# Patient Record
Sex: MALE | Race: WHITE | Employment: OTHER | URBAN - NONMETROPOLITAN AREA
[De-identification: names, ages, dates, MRNs, and addresses within clinical notes are randomized per-mention and may not be internally consistent; named-entity substitution may affect disease eponyms.]

---

## 2020-11-20 ENCOUNTER — TELEPHONE (OUTPATIENT)
Dept: FAMILY MEDICINE CLINIC | Age: 65
End: 2020-11-20

## 2020-11-20 NOTE — TELEPHONE ENCOUNTER
[address] [facility]     Patient spouse has questions regarding getting medicaid for patient. And is unsure how to get the process started. I was unsure if you might have any information to relay to them regarding this. Patient wife ph. [phone]    Thank you,.

## 2020-11-23 ENCOUNTER — CARE COORDINATION (OUTPATIENT)
Dept: CARE COORDINATION | Age: 65
End: 2020-11-23

## 2020-11-23 NOTE — CARE COORDINATION
Ambulatory Care Coordination Note  11/23/2020  CM Risk Score: 0  Charlson 10 Year Mortality Risk Score: 23%     ACC: Maximo Ahumada, ASHLEY    Summary Note:      I called to follow up with Armida Stevenson per her request from the [place] office. She states that her  has had 2 strokes and does not have medical insurance. She says that he has not worked since 2016. She tells me that she would like to get him on disability so that he can be insured. She says that she spoke with someone from the \"medicaid office\" but they were \"rude and did not want to answer my questions. \"  I explained that the only place to obtain assistance and Medicaid is through [employer]. I also explained that with COVID they are not seeing many people in the office. I suggested that she call and make a phone appointment to get things started. She does have the information for [employer] and will follow up with that department. Goals Addressed    None         Prior to Admission medications    Medication Sig Start Date End Date Taking?  Authorizing Provider   amLODIPine (NORVASC) 10 MG tablet TAKE 1 TABLET BY MOUTH ONCE DAILY 11/17/16   Demar Do MD   pravastatin (PRAVACHOL) 40 MG tablet Take 1 tablet by mouth daily 11/17/16   Demar Do MD   dipyridamole-aspirin [facility])  MG per extended release capsule Take 1 capsule by mouth 2 times daily 11/17/16   Demar Do MD       Future Appointments   Date Time Provider Peter Gallo   12/2/2020 10:00 AM Demar Do MD [facility] [place]
[employer]

## 2020-12-02 ENCOUNTER — OFFICE VISIT (OUTPATIENT)
Dept: FAMILY MEDICINE CLINIC | Age: 65
End: 2020-12-02

## 2020-12-02 VITALS
OXYGEN SATURATION: 96 % | TEMPERATURE: 97.2 F | HEART RATE: 85 BPM | DIASTOLIC BLOOD PRESSURE: 74 MMHG | BODY MASS INDEX: 29.06 KG/M2 | SYSTOLIC BLOOD PRESSURE: 118 MMHG | HEIGHT: 70 IN | WEIGHT: 203 LBS

## 2020-12-02 PROCEDURE — 99212 OFFICE O/P EST SF 10 MIN: CPT | Performed by: FAMILY MEDICINE

## 2020-12-02 RX ORDER — CLOPIDOGREL BISULFATE 75 MG/1
75 TABLET ORAL DAILY
Qty: 30 TABLET | Refills: 11 | Status: SHIPPED | OUTPATIENT
Start: 2020-12-02 | End: 2021-03-10 | Stop reason: SDUPTHER

## 2020-12-02 RX ORDER — PRAVASTATIN SODIUM 40 MG
40 TABLET ORAL DAILY
Qty: 30 TABLET | Refills: 11 | Status: SHIPPED | OUTPATIENT
Start: 2020-12-02 | End: 2021-03-10 | Stop reason: SDUPTHER

## 2020-12-02 SDOH — ECONOMIC STABILITY: INCOME INSECURITY: HOW HARD IS IT FOR YOU TO PAY FOR THE VERY BASICS LIKE FOOD, HOUSING, MEDICAL CARE, AND HEATING?: NOT HARD AT ALL

## 2020-12-02 SDOH — ECONOMIC STABILITY: TRANSPORTATION INSECURITY
IN THE PAST 12 MONTHS, HAS THE LACK OF TRANSPORTATION KEPT YOU FROM MEDICAL APPOINTMENTS OR FROM GETTING MEDICATIONS?: NO

## 2020-12-02 SDOH — ECONOMIC STABILITY: FOOD INSECURITY: WITHIN THE PAST 12 MONTHS, YOU WORRIED THAT YOUR FOOD WOULD RUN OUT BEFORE YOU GOT MONEY TO BUY MORE.: NEVER TRUE

## 2020-12-02 SDOH — ECONOMIC STABILITY: FOOD INSECURITY: WITHIN THE PAST 12 MONTHS, THE FOOD YOU BOUGHT JUST DIDN'T LAST AND YOU DIDN'T HAVE MONEY TO GET MORE.: NEVER TRUE

## 2020-12-02 SDOH — ECONOMIC STABILITY: TRANSPORTATION INSECURITY
IN THE PAST 12 MONTHS, HAS LACK OF TRANSPORTATION KEPT YOU FROM MEETINGS, WORK, OR FROM GETTING THINGS NEEDED FOR DAILY LIVING?: NO

## 2020-12-02 ASSESSMENT — PATIENT HEALTH QUESTIONNAIRE - PHQ9
SUM OF ALL RESPONSES TO PHQ QUESTIONS 1-9: 0
SUM OF ALL RESPONSES TO PHQ QUESTIONS 1-9: 0
SUM OF ALL RESPONSES TO PHQ9 QUESTIONS 1 & 2: 0
SUM OF ALL RESPONSES TO PHQ QUESTIONS 1-9: 0
2. FEELING DOWN, DEPRESSED OR HOPELESS: 0
1. LITTLE INTEREST OR PLEASURE IN DOING THINGS: 0

## 2020-12-02 NOTE — PROGRESS NOTES
Chief Complaint   Patient presents with   Orvilleemmanuel Rodolfo     Pt is self pay, been off of medications for about a year and a half. wanted to discuss getting bloodwork.  Health Maintenance     Pt does not get flu shot       HPI:  Fidelina Gonzalez is a 60 y.o. male  Chronic issue follow up    Not seen since 2016 due to no insurance     Hx CVA, HTN, dyslipidemia    Quit smoking  No further stroke symptoms   Wife notes some memory issues more recently    Has generally been feeling well    Due for labs and refilled  Doesn't want bloodwork done    Losing his insurance after the first of the year!     Home BP not being checked - needs to  Remains smoke free    Social History     Socioeconomic History    Marital status:      Spouse name: None    Number of children: None    Years of education: None    Highest education level: None   Occupational History    None   Social Needs    Financial resource strain: Not hard at all   [place]-Jes[place]s insecurity     Worry: Never true     Inability: Never true    Transportation needs     Medical: No     Non-medical: No   Tobacco Use    Smoking status: Former Smoker     Packs/day: 0.50     Years: 37.00     Pack years: 18.50     Types: Cigarettes     Start date: 1976     Last attempt to quit: 2013     Years since quittin.0    Smokeless tobacco: Never Used   Substance and Sexual Activity    Alcohol use: Yes     Comment: social - only small amount on Friday, sometimes heavier on Saturday    Drug use: None    Sexual activity: None   Lifestyle    Physical activity     Days per week: None     Minutes per session: None    Stress: None   Relationships    Social connections     Talks on phone: None     Gets together: None     Attends Episcopalian service: None     Active member of club or organization: None     Attends meetings of clubs or organizations: None     Relationship status: None    Intimate partner violence     Fear of current or ex partner: None     Emotionally abused: None     Physically abused: None     Forced sexual activity: None   Other Topics Concern    None   Social History Narrative    None         MRI from past   IMPRESSION ACUTE NONHEMORRHAGIC AREA OF ISCHEMIC INFARCTION INVOLVING THE POSTERIOR LEFT BASAL GANGLIA IN THE REGION OF THE GLOBUS PALLIDUS. Wt Readings from Last 3 Encounters:   12/02/20 203 lb (92.1 kg)   11/17/16 216 lb 6.4 oz (98.2 kg)   11/20/15 190 lb 9.6 oz (86.5 kg)       Patient Active Problem List   Diagnosis    Allergic rhinitis    Hypertension    St[address] [address]    Pilonidal cyst       Current Outpatient Medications   Medication Sig Dispense Refill    clopidogrel (PLAVIX) 75 MG tablet Take 1 tablet by mouth daily 30 tablet 11    pravastatin (PRAVACHOL) 40 MG tablet Take 1 tablet by mouth daily 30 tablet 11    amLODIPine (NORVASC) 10 MG tablet TAKE 1 TABLET BY MOUTH ONCE DAILY 30 tablet 11    dipyridamole-aspirin (AGGRENOX)  MG per extended release capsule Take 1 capsule by mouth 2 times daily 60 capsule 11     No current facility-administered medications for this visit. Patient's medications, allergies, past medical, surgical, social and family histories were reviewed and updated as appropriate. Review of Systems:   General ROS: negative for - chills, fatigue, fever, malaise, weight gain or weight loss  Respiratory ROS: no cough, shortness of breath, or wheezing  Cardiovascular ROS: no chest pain or dyspnea on exertion  Gastrointestinal ROS: no abdominal pain, change in bowel habits, or black or bloody stools  Genito-Urinary ROS: no dysuria, trouble voiding  Musculoskeletal ROS: negative for - gait disturbance, joint pain or joint stiffness  Neurological ROS: slow speech    In general patient otherwise reports feeling well.      Physical Exam:  /74 (Site: Left Upper Arm, Position: Sitting, Cuff Size: Large Adult)   Pulse 85   Temp 97.2 °F (36.2 °C) (Infrared)   Ht 5' 10\" (1.778 m)   Wt 203 lb (92.1 kg) SpO2 96%   BMI 29.13 kg/m²     Gen: Well, NAD, Alert, Oriented x 3   HEENT: EOMI, eyes clear, MMM  Skin: without rash or jaundice  Neck: no significant lymphadenopathy or thyromegaly  Lungs: CTA B w/out Rales/Wheezes/Rhonchi, Good respiratory effort   Heart: RRR, S1S2, w/out M/R/G, non-displaced PMI   Abdomen: Soft NT/ND, w/out R/G, w/ +BSx4   Ext: No C/C/E Bilaterally. Neuro: overall appears neurologically intact      Lab Results   Component Value Date    WBC 7.3 11/20/2015    HGB 15.7 11/20/2015    HCT 48.9 11/20/2015     11/20/2015    CHOL 219 (H) 11/20/2015    TRIG 69 11/20/2015    HDL 76 (H) 11/20/2015    ALT 38 11/20/2015    AST 27 11/20/2015     11/20/2015    K 4.2 11/20/2015    CL 99 11/20/2015    CREATININE 0.88 11/20/2015    BUN 11 11/20/2015    CO2 23 11/20/2015    TSH 2.862 09/21/2012    INR 1.0 11/17/2013         A&P   Diagnosis Orders   1. Cerebrovascular accident (CVA), unspecified mechanism ([address]  clopidogrel (PLAVIX) 75 MG tablet   2. Essential hypertension  pravastatin (PRAVACHOL) 40 MG tablet   3.  Cerebrovascular accident (CVA) due to thrombosis of precerebral artery (HCC)  pravastatin (PRAVACHOL) 40 MG tablet        Continue to monitor bp at home  Hasn't been checking  He has remained smoke free    Plavix, pravachol, aspirin  Maybe norvasc    [facility] for labs    Good rx card    Sign up for medicare when eligible              Lyla Maravilla MD

## 2020-12-13 LAB
ALBUMIN SERPL-MCNC: 4.8 G/DL
ALP BLD-CCNC: 88 U/L
ALT SERPL-CCNC: 43 U/L
ANION GAP SERPL CALCULATED.3IONS-SCNC: NORMAL MMOL/L
AST SERPL-CCNC: 24 U/L
AVERAGE GLUCOSE: NORMAL
BILIRUB SERPL-MCNC: 0.6 MG/DL (ref 0.1–1.4)
BUN BLDV-MCNC: 11 MG/DL
CALCIUM SERPL-MCNC: 9.6 MG/DL
CHLORIDE BLD-SCNC: 100 MMOL/L
CHOLESTEROL, TOTAL: 203 MG/DL
CHOLESTEROL/HDL RATIO: NORMAL
CO2: 27 MMOL/L
CREAT SERPL-MCNC: 0.94 MG/DL
GFR CALCULATED: <60
GLUCOSE BLD-MCNC: 307 MG/DL
HBA1C MFR BLD: 11.3 %
HDLC SERPL-MCNC: 48 MG/DL (ref 35–70)
LDL CHOLESTEROL CALCULATED: 134 MG/DL (ref 0–160)
NONHDLC SERPL-MCNC: NORMAL MG/DL
POTASSIUM SERPL-SCNC: 4.2 MMOL/L
PROSTATE SPECIFIC ANTIGEN: 1.52 NG/ML
SODIUM BLD-SCNC: 139 MMOL/L
TOTAL PROTEIN: 7
TRIGL SERPL-MCNC: 107 MG/DL
TSH SERPL DL<=0.05 MIU/L-ACNC: 2.67 UIU/ML
VLDLC SERPL CALC-MCNC: NORMAL MG/DL

## 2021-01-08 ENCOUNTER — OFFICE VISIT (OUTPATIENT)
Dept: FAMILY MEDICINE CLINIC | Age: 66
End: 2021-01-08

## 2021-01-08 VITALS
OXYGEN SATURATION: 94 % | TEMPERATURE: 98.2 F | DIASTOLIC BLOOD PRESSURE: 82 MMHG | HEIGHT: 70 IN | WEIGHT: 207 LBS | HEART RATE: 78 BPM | BODY MASS INDEX: 29.63 KG/M2 | SYSTOLIC BLOOD PRESSURE: 136 MMHG

## 2021-01-08 DIAGNOSIS — E11.65 TYPE 2 DIABETES MELLITUS WITH HYPERGLYCEMIA, WITHOUT LONG-TERM CURRENT USE OF INSULIN (HCC): ICD-10-CM

## 2021-01-08 DIAGNOSIS — I63.9 CEREBROVASCULAR ACCIDENT (CVA), UNSPECIFIED MECHANISM (HCC): Primary | ICD-10-CM

## 2021-01-08 DIAGNOSIS — I10 ESSENTIAL HYPERTENSION: ICD-10-CM

## 2021-01-08 PROCEDURE — 99212 OFFICE O/P EST SF 10 MIN: CPT | Performed by: FAMILY MEDICINE

## 2021-01-08 ASSESSMENT — PATIENT HEALTH QUESTIONNAIRE - PHQ9
1. LITTLE INTEREST OR PLEASURE IN DOING THINGS: 0
SUM OF ALL RESPONSES TO PHQ QUESTIONS 1-9: 0

## 2021-01-08 NOTE — PROGRESS NOTES
Chief Complaint   Patient presents with    Results     go over blood work, diaetic        HPI:  Ceferino Clinton is a 60 y.o. male    Hx CVA, HTN, dyslipidemia    Lbs done through [facility] showed diabetes with significant hyperglycemia    Father was diabetic     \"addicted to milkshakes/ice cream\"    We discussed changing diet habits      Lab Results   Component Value Date    LABA1C 11.3 (H) 2020     No results found for: EAG      Social History     Socioeconomic History    Marital status:      Spouse name: None    Number of children: None    Years of education: None    Highest education level: None   Occupational History    None   Social Needs    Financial resource strain: Not hard at all   [place] insecurity     Worry: Never true     Inability: Never true    Transportation needs     Medical: No     Non-medical: No   Tobacco Use    Smoking status: Former Smoker     Packs/day: 0.50     Years: 37.00     Pack years: 18.50     Types: Cigarettes     Start date: 1976     Quit date: 2013     Years since quittin.1    Smokeless tobacco: Never Used   Substance and Sexual Activity    Alcohol use: Yes     Comment: social - only small amount on Friday, sometimes heavier on Saturday    Drug use: None    Sexual activity: None   Lifestyle    Physical activity     Days per week: None     Minutes per session: None    Stress: None   Relationships    Social connections     Talks on phone: None     Gets together: None     Attends Sabianism service: None     Active member of club or organization: None     Attends meetings of clubs or organizations: None     Relationship status: None    Intimate partner violence     Fear of current or ex partner: None     Emotionally abused: None     Physically abused: None     Forced sexual activity: None   Other Topics Concern    None   Social History Narrative    None         MRI from past   IMPRESSION ACUTE NONHEMORRHAGIC AREA OF ISCHEMIC INFARCTION INVOLVING THE POSTERIOR LEFT BASAL GANGLIA IN THE REGION OF THE GLOBUS PALLIDUS. Wt Readings from Last 3 Encounters:   01/08/21 207 lb (93.9 kg)   12/02/20 203 lb (92.1 kg)   11/17/16 216 lb 6.4 oz (98.2 kg)       Patient Active Problem List   Diagnosis    Allergic rhinitis    Hypertension    Stroke ([facility] [address]    Pilonidal cyst       Current Outpatient Medications   Medication Sig Dispense Refill    metFORMIN (GLUCOPHAGE) 1000 MG tablet Take 1 tablet by mouth 2 times daily (with meals) 60 tablet 5    clopidogrel (PLAVIX) 75 MG tablet Take 1 tablet by mouth daily 30 tablet 11    pravastatin (PRAVACHOL) 40 MG tablet Take 1 tablet by mouth daily 30 tablet 11    dipyridamole-aspirin (AGGRENOX)  MG per extended release capsule Take 1 capsule by mouth 2 times daily 60 capsule 11    amLODIPine (NORVASC) 10 MG tablet TAKE 1 TABLET BY MOUTH ONCE DAILY (Patient not taking: Reported on 1/8/2021) 30 tablet 11     No current facility-administered medications for this visit. Patient's medications, allergies, past medical, surgical, social and family histories were reviewed and updated as appropriate. Review of Systems:   General ROS: negative for - chills, fatigue, fever, malaise, weight gain or weight loss  Respiratory ROS: no cough, shortness of breath, or wheezing  Cardiovascular ROS: no chest pain or dyspnea on exertion  Gastrointestinal ROS: no abdominal pain, change in bowel habits, or black or bloody stools  Genito-Urinary ROS: no dysuria, trouble voiding  Musculoskeletal ROS: negative for - gait disturbance, joint pain or joint stiffness  Neurological ROS: normal speech    In general patient otherwise reports feeling well.      Physical Exam:  /82 (Site: Left Upper Arm)   Pulse 78   Temp 98.2 °F (36.8 °C)   Ht 5' 10\" (1.778 m)   Wt 207 lb (93.9 kg)   SpO2 94%   BMI 29.70 kg/m²     Gen: Well, NAD, Alert, Oriented x 3   HEENT: EOMI, eyes clear, MMM  Skin: without rash or jaundice  Neuro: overall appears neurologically intact      Lab Results   Component Value Date    WBC 7.3 11/20/2015    HGB 15.7 11/20/2015    HCT 48.9 11/20/2015     11/20/2015    CHOL 203 (H) 12/13/2020    TRIG 107 12/13/2020    HDL 48 12/13/2020    ALT 43 (H) 12/13/2020    AST 24 12/13/2020     12/13/2020    K 4.2 12/13/2020     12/13/2020    CREATININE 0.94 12/13/2020    BUN 11 12/13/2020    CO2 27 12/13/2020    TSH 2.650 12/13/2020    PSA 1.52 12/13/2020    INR 1.0 11/17/2013    LABA1C 11.3 (H) 12/13/2020         A&P   Diagnosis Orders   1. Cerebrovascular accident (CVA), unspecified mechanism ([facility] [address]     2. Essential hypertension     3.  Type 2 diabetes mellitus with hyperglycemia, without long-term current use of insulin (HCC)  metFORMIN (GLUCOPHAGE) 1000 MG tablet          Exercise  Will need glucometer and supplies    Will do metformin  He has sulfa allergy    Likely add januvia or SGLT-2 med when has medicare    Continue to monitor bp at home  Hasn't been checking  He has remained smoke free    Plavix, pravachol, aspirin  Maybe norvasc    Search youtube for exercise and diabetic diet advice    Good rx card    Sign up for medicare when eligible              Kingsley Merrill MD

## 2021-02-18 ENCOUNTER — TELEPHONE (OUTPATIENT)
Dept: FAMILY MEDICINE CLINIC | Age: 66
End: 2021-02-18

## 2021-03-10 ENCOUNTER — OFFICE VISIT (OUTPATIENT)
Dept: FAMILY MEDICINE CLINIC | Age: 66
End: 2021-03-10

## 2021-03-10 VITALS
DIASTOLIC BLOOD PRESSURE: 92 MMHG | SYSTOLIC BLOOD PRESSURE: 160 MMHG | TEMPERATURE: 98.5 F | OXYGEN SATURATION: 96 % | BODY MASS INDEX: 29.86 KG/M2 | HEIGHT: 70 IN | HEART RATE: 77 BPM | WEIGHT: 208.6 LBS

## 2021-03-10 DIAGNOSIS — E11.65 TYPE 2 DIABETES MELLITUS WITH HYPERGLYCEMIA, WITHOUT LONG-TERM CURRENT USE OF INSULIN (HCC): ICD-10-CM

## 2021-03-10 DIAGNOSIS — I63.00 CEREBROVASCULAR ACCIDENT (CVA) DUE TO THROMBOSIS OF PRECEREBRAL ARTERY (HCC): ICD-10-CM

## 2021-03-10 DIAGNOSIS — I63.9 CEREBROVASCULAR ACCIDENT (CVA), UNSPECIFIED MECHANISM (HCC): ICD-10-CM

## 2021-03-10 DIAGNOSIS — I10 ESSENTIAL HYPERTENSION: ICD-10-CM

## 2021-03-10 PROCEDURE — 99212 OFFICE O/P EST SF 10 MIN: CPT | Performed by: FAMILY MEDICINE

## 2021-03-10 RX ORDER — ASPIRIN 81 MG/1
81 TABLET ORAL DAILY
COMMUNITY

## 2021-03-10 RX ORDER — LOSARTAN POTASSIUM 100 MG/1
100 TABLET ORAL DAILY
Qty: 30 TABLET | Refills: 11 | Status: SHIPPED | OUTPATIENT
Start: 2021-03-10 | End: 2022-03-14

## 2021-03-10 RX ORDER — CLOPIDOGREL BISULFATE 75 MG/1
75 TABLET ORAL DAILY
Qty: 30 TABLET | Refills: 11 | Status: SHIPPED | OUTPATIENT
Start: 2021-03-10 | End: 2022-03-14

## 2021-03-10 RX ORDER — PRAVASTATIN SODIUM 40 MG
40 TABLET ORAL DAILY
Qty: 30 TABLET | Refills: 11 | Status: SHIPPED | OUTPATIENT
Start: 2021-03-10 | End: 2022-03-14

## 2021-03-10 NOTE — PROGRESS NOTES
Chief Complaint   Patient presents with    Hypertension     check up       HPI:  Kulwinder Sabillon is a 60 y.o. male    Hx CVA, HTN, dyslipidemia    F/u diabetes    Sugars under 200      Lab Results   Component Value Date    LABA1C 11.3 (H) 2020     No results found for: EAG      Social History     Socioeconomic History    Marital status:      Spouse name: None    Number of children: None    Years of education: None    Highest education level: None   Occupational History    None   Social Needs    Financial resource strain: Not hard at all   [place]-Jesús insecurity     Worry: Never true     Inability: Never true    Transportation needs     Medical: No     Non-medical: No   Tobacco Use    Smoking status: Former Smoker     Packs/day: 0.50     Years: 37.00     Pack years: 18.50     Types: Cigarettes     Start date: 1976     Quit date: 2013     Years since quittin.3    Smokeless tobacco: Never Used   Substance and Sexual Activity    Alcohol use: Yes     Comment: social - only small amount on Friday, sometimes heavier on Saturday    Drug use: None    Sexual activity: None   Lifestyle    Physical activity     Days per week: None     Minutes per session: None    Stress: None   Relationships    Social connections     Talks on phone: None     Gets together: None     Attends Judaism service: None     Active member of club or organization: None     Attends meetings of clubs or organizations: None     Relationship status: None    Intimate partner violence     Fear of current or ex partner: None     Emotionally abused: None     Physically abused: None     Forced sexual activity: None   Other Topics Concern    None   Social History Narrative    None         MRI from past   IMPRESSION ACUTE NONHEMORRHAGIC AREA OF ISCHEMIC INFARCTION INVOLVING THE POSTERIOR LEFT BASAL GANGLIA  [address]     Wt Readings from Last 3 Encounters:   03/10/21 208 lb 9.6 oz (94.6 kg) 01/08/21 207 lb (93.9 kg)   12/02/20 203 lb (92.1 kg)       Patient Active Problem List   Diagnosis    Allergic rhinitis    Hypertension    Stroke ([facility] [address]    Pilonidal cyst       Current Outpatient Medications   Medication Sig Dispense Refill    aspirin 81 MG EC tablet Take 81 mg by mouth daily      clopidogrel (PLAVIX) 75 MG tablet Take 1 tablet by mouth daily 30 tablet 11    metFORMIN (GLUCOPHAGE) 1000 MG tablet Take 1 tablet by mouth 2 times daily (with meals) 60 tablet 5    pravastatin (PRAVACHOL) 40 MG tablet Take 1 tablet by mouth daily 30 tablet 11    losartan (COZAAR) 100 MG tablet Take 1 tablet by mouth daily 30 tablet 11     No current facility-administered medications for this visit. Patient's medications, allergies, past medical, surgical, social and family histories were reviewed and updated as appropriate. Review of Systems:   General ROS: negative for - chills, fatigue, fever, malaise, weight gain or weight loss  Respiratory ROS: no cough, shortness of breath, or wheezing  Cardiovascular ROS: no chest pain or dyspnea on exertion  Gastrointestinal ROS: no abdominal pain, change in bowel habits, or black or bloody stools  Genito-Urinary ROS: no dysuria, trouble voiding  Musculoskeletal ROS: negative for - gait disturbance, joint pain or joint stiffness  Neurological ROS: normal speech    In general patient otherwise reports feeling well.      Physical Exam:  BP (!) 160/92 (Site: Right Upper Arm, Position: Sitting, Cuff Size: Medium Adult)   Pulse 77   Temp 98.5 °F (36.9 °C)   Ht 5' 10\" (1.778 m)   Wt 208 lb 9.6 oz (94.6 kg)   SpO2 96%   BMI 29.93 kg/m²     Gen: Well, NAD, Alert, Oriented x 3   HEENT: EOMI, eyes clear, MMM  Skin: without rash or jaundice  Neuro: overall appears neurologically intact      Lab Results   Component Value Date    WBC 7.3 11/20/2015    HGB 15.7 11/20/2015    HCT 48.9 11/20/2015     11/20/2015    CHOL 203 (H) 12/13/2020    TRIG 107 12/13/2020    HDL 48 12/13/2020    ALT 43 (H) 12/13/2020    AST 24 12/13/2020     12/13/2020    K 4.2 12/13/2020     12/13/2020    CREATININE 0.94 12/13/2020    BUN 11 12/13/2020    CO2 27 12/13/2020    TSH 2.650 12/13/2020    PSA 1.52 12/13/2020    INR 1.0 11/17/2013    LABA1C 11.3 (H) 12/13/2020         A&P   Diagnosis Orders   1. Cerebrovascular accident (CVA), unspecified mechanism ([address]  clopidogrel (PLAVIX) 75 MG tablet   2. Type 2 diabetes mellitus with hyperglycemia, without long-term current use of insulin (HCC)  metFORMIN (GLUCOPHAGE) 1000 MG tablet   3. Essential hypertension  pravastatin (PRAVACHOL) 40 MG tablet    losartan (COZAAR) 100 MG tablet   4.  Cerebrovascular accident (CVA) due to thrombosis of precerebral artery (HCC)  pravastatin (PRAVACHOL) 40 MG tablet          Exercise    Add losartan    Refills given    Likely add januvia or SGLT-2 med when has medicare    Continue to monitor bp at home  Hasn't been checking  He has remained smoke free      Sign up for medicare when eligible      Code down, self pay  Level 2    Ziyad Zamudio MD

## 2021-04-20 ENCOUNTER — OFFICE VISIT (OUTPATIENT)
Dept: FAMILY MEDICINE CLINIC | Age: 66
End: 2021-04-20

## 2021-04-20 VITALS
WEIGHT: 207.8 LBS | HEIGHT: 70 IN | SYSTOLIC BLOOD PRESSURE: 132 MMHG | OXYGEN SATURATION: 96 % | BODY MASS INDEX: 29.75 KG/M2 | HEART RATE: 75 BPM | DIASTOLIC BLOOD PRESSURE: 86 MMHG | TEMPERATURE: 97.9 F

## 2021-04-20 DIAGNOSIS — Z86.19 H/O VIRAL ILLNESS: ICD-10-CM

## 2021-04-20 DIAGNOSIS — I63.9 CEREBROVASCULAR ACCIDENT (CVA), UNSPECIFIED MECHANISM (HCC): ICD-10-CM

## 2021-04-20 DIAGNOSIS — R41.3 MEMORY LOSS: Primary | ICD-10-CM

## 2021-04-20 DIAGNOSIS — E11.65 TYPE 2 DIABETES MELLITUS WITH HYPERGLYCEMIA, WITHOUT LONG-TERM CURRENT USE OF INSULIN (HCC): ICD-10-CM

## 2021-04-20 DIAGNOSIS — R41.3 MEMORY LOSS: ICD-10-CM

## 2021-04-20 LAB — HBA1C MFR BLD: 8.7 % (ref 4.8–5.9)

## 2021-04-20 PROCEDURE — 99212 OFFICE O/P EST SF 10 MIN: CPT | Performed by: FAMILY MEDICINE

## 2021-04-20 RX ORDER — DONEPEZIL HYDROCHLORIDE 5 MG/1
5 TABLET, FILM COATED ORAL NIGHTLY
Qty: 30 TABLET | Refills: 5 | Status: SHIPPED | OUTPATIENT
Start: 2021-04-20 | End: 2021-10-04 | Stop reason: SDUPTHER

## 2021-04-20 NOTE — PROGRESS NOTES
Chief Complaint   Patient presents with    Memory Loss       HPI:  Leoma Goltz is a 60 y.o. male    Here with wife    She gives most of the history    Trouble with time/space issues    Concern for early signs of dementia    Previously was always very punctual    Hx CVA, HTN, dyslipidemia, diabetes    He has tried prevagen supplement, not consistently      Lab Results   Component Value Date    LABA1C 11.3 (H) 2020     No results found for: EAG      Social History     Socioeconomic History    Marital status:      Spouse name: None    Number of children: None    Years of education: None    Highest education level: None   Occupational History    None   Social Needs    Financial resource strain: Not hard at all   [employer] insecurity     Worry: Never true     Inability: Never true    Transportation needs     Medical: No     Non-medical: No   Tobacco Use    Smoking status: Former Smoker     Packs/day: 0.50     Years: 37.00     Pack years: 18.50     Types: Cigarettes     Start date: 1976     Quit date: 2013     Years since quittin.4    Smokeless tobacco: Never Used   Substance and Sexual Activity    Alcohol use: Yes     Comment: social - only small amount on Friday, sometimes heavier on Saturday    Drug use: None    Sexual activity: None   Lifestyle    Physical activity     Days per week: None     Minutes per session: None    Stress: None   Relationships    Social connections     Talks on phone: None     Gets together: None     Attends Jew service: None     Active member of club or organization: None     Attends meetings of clubs or organizations: None     Relationship status: None    Intimate partner violence     Fear of current or ex partner: None     Emotionally abused: None     Physically abused: None     Forced sexual activity: None   Other Topics Concern    None   Social History Narrative    None         MRI from past   IMPRESSION ACUTE NONHEMORRHAGIC AREA OF ISCHEMIC INFARCTION INVOLVING THE POSTERIOR LEFT BASAL GANGLIA IN THE REGION OF THE GLOBUS PALLIDUS. Wt Readings from Last 3 Encounters:   04/20/21 207 lb 12.8 oz (94.3 kg)   03/10/21 208 lb 9.6 oz (94.6 kg)   01/08/21 207 lb (93.9 kg)       Patient Active Problem List   Diagnosis    Allergic rhinitis    Hypertension    St[facility] ([facility] [address]    Pilonidal cyst       Current Outpatient Medications   Medication Sig Dispense Refill    donepezil (ARICEPT) 5 MG tablet Take 1 tablet by mouth nightly 30 tablet 5    aspirin 81 MG EC tablet Take 81 mg by mouth daily      clopidogrel (PLAVIX) 75 MG tablet Take 1 tablet by mouth daily 30 tablet 11    metFORMIN (GLUCOPHAGE) 1000 MG tablet Take 1 tablet by mouth 2 times daily (with meals) 60 tablet 5    pravastatin (PRAVACHOL) 40 MG tablet Take 1 tablet by mouth daily 30 tablet 11    losartan (COZAAR) 100 MG tablet Take 1 tablet by mouth daily 30 tablet 11     No current facility-administered medications for this visit. Patient's medications, allergies, past medical, surgical, social and family histories were reviewed and updated as appropriate. Review of Systems:   General ROS: negative for - chills, fatigue, fever, malaise, weight gain or weight loss  Respiratory ROS: no cough, shortness of breath, or wheezing  Cardiovascular ROS: no chest pain or dyspnea on exertion  Gastrointestinal ROS: no abdominal pain, change in bowel habits, or black or bloody stools  Genito-Urinary ROS: no dysuria, trouble voiding  Musculoskeletal ROS: negative for - gait disturbance, joint pain or joint stiffness  Neurological ROS: normal speech    In general patient otherwise reports feeling well.      Physical Exam:  /86 (Site: Right Upper Arm)   Pulse 75   Temp 97.9 °F (36.6 °C)   Ht 5' 10\" (1.778 m)   Wt 207 lb 12.8 oz (94.3 kg)   SpO2 96%   BMI 29.82 kg/m²     Gen: Well, NAD, Alert, Oriented x 3   HEENT: EOMI, eyes clear, MMM  Skin: without rash or jaundice  Neuro: overall appears neurologically intact      Lab Results   Component Value Date    WBC 7.3 11/20/2015    HGB 15.7 11/20/2015    HCT 48.9 11/20/2015     11/20/2015    CHOL 203 (H) 12/13/2020    TRIG 107 12/13/2020    HDL 48 12/13/2020    ALT 43 (H) 12/13/2020    AST 24 12/13/2020     12/13/2020    K 4.2 12/13/2020     12/13/2020    CREATININE 0.94 12/13/2020    BUN 11 12/13/2020    CO2 27 12/13/2020    TSH 2.650 12/13/2020    PSA 1.52 12/13/2020    INR 1.0 11/17/2013    LABA1C 11.3 (H) 12/13/2020         A&P   Diagnosis Orders   1. Memory loss  Covid-19, Antibody    donepezil (ARICEPT) 5 MG tablet   2. H/O viral illness  Covid-19, Antibody   3. Type 2 diabetes mellitus with hyperglycemia, without long-term current use of insulin ([facility])  Hemoglobin A1C   4.  Cerebrovascular accident (CVA), unspecified mechanism ([facility] [address]            Stay active     Likely add januvia or SGLT-2 med when has medicare    Continue to monitor bp at home  Hasn't been checking  He has remained smoke free    Neuroimaging and neurology referral when medicare secured     Code down, self pay  Level 2    Rip Sanon MD

## 2021-04-22 LAB — SARS-COV-2 ANTIBODY, TOTAL: NEGATIVE

## 2021-06-01 ENCOUNTER — HOSPITAL ENCOUNTER (EMERGENCY)
Age: 66
Discharge: HOME | End: 2021-06-01

## 2021-06-01 VITALS
HEART RATE: 85 BPM | TEMPERATURE: 99.2 F | SYSTOLIC BLOOD PRESSURE: 167 MMHG | RESPIRATION RATE: 16 BRPM | HEIGHT: 70 IN | OXYGEN SATURATION: 97 % | DIASTOLIC BLOOD PRESSURE: 101 MMHG | BODY MASS INDEX: 29.78 KG/M2 | WEIGHT: 208 LBS

## 2021-06-01 DIAGNOSIS — S61.211A LACERATION OF LEFT INDEX FINGER WITHOUT DAMAGE TO NAIL, FOREIGN BODY PRESENCE UNSPECIFIED, INITIAL ENCOUNTER: Primary | ICD-10-CM

## 2021-06-01 PROCEDURE — 99283 EMERGENCY DEPT VISIT LOW MDM: CPT

## 2021-06-01 PROCEDURE — 6370000000 HC RX 637 (ALT 250 FOR IP): Performed by: PHYSICIAN ASSISTANT

## 2021-06-01 RX ORDER — BACITRACIN, NEOMYCIN, POLYMYXIN B 400; 3.5; 5 [USP'U]/G; MG/G; [USP'U]/G
OINTMENT TOPICAL ONCE
Status: COMPLETED | OUTPATIENT
Start: 2021-06-01 | End: 2021-06-01

## 2021-06-01 RX ADMIN — BACITRACIN, NEOMYCIN, POLYMYXIN B: 400; 3.5; 5 OINTMENT TOPICAL at 21:39

## 2021-06-01 RX ADMIN — GELATIN ABSORBABLE SPONGE 12-7 MM 1 EACH: 12-7 MISC at 21:38

## 2021-06-01 ASSESSMENT — ENCOUNTER SYMPTOMS
NAUSEA: 0
COUGH: 0
ABDOMINAL DISTENTION: 0
VOMITING: 0
SHORTNESS OF BREATH: 0
VOICE CHANGE: 0
PHOTOPHOBIA: 0
EYE DISCHARGE: 0
APNEA: 0
ANAL BLEEDING: 0

## 2021-06-02 NOTE — ED NOTES
The patient has had the wound cleaned and covered with gelfoam. The wound has been wrapped and adequate pressure applied. Will continue to monitor.       Gisela Nunez RN  06/01/21 [id number]

## 2021-06-02 NOTE — ED PROVIDER NOTES
[address] [facility] ED  eMERGENCY dEPARTMENT eNCOUnter      Pt Name: Shy Garcia  MRN: [id number]  [place] 1955  Date of evaluation: 6/1/2021  Provider: Joseph Washburn PA-C    CHIEF COMPLAINT       Chief Complaint   Patient presents with    Laceration     cut finger at noon, cant get bleeding to stop         HISTORY OF PRESENT ILLNESS   (Location/Symptom, Timing/Onset,Context/Setting, Quality, Duration, Modifying Factors, Severity)  Note limiting factors. Shy Garcia is a 60 y.o. male who presents to the emergency department patient cut left index finger at noon with a knife he has been washing it and removing bandage since. He continues to bleed when touched. Currently no active bleeding with dressing in place. Patient denies any additional injuries. Symptoms mild to moderate severity nothing proves worsen symptoms. HPI    NursingNotes were reviewed. REVIEW OF SYSTEMS    (2-9 systems for level 4, 10 or more for level 5)     Review of Systems   Constitutional: Negative for activity change, appetite change, fever and unexpected weight change. HENT: Negative for ear discharge, nosebleeds and voice change. Eyes: Negative for photophobia and discharge. Respiratory: Negative for apnea, cough and shortness of breath. Cardiovascular: Negative for chest pain. Gastrointestinal: Negative for abdominal distention, anal bleeding, nausea and vomiting. Endocrine: Negative for cold intolerance, heat intolerance and polyphagia. Genitourinary: Negative for genital sores. Musculoskeletal: Negative for joint swelling. Skin: Positive for wound. Negative for pallor. Allergic/Immunologic: Negative for immunocompromised state. Neurological: Negative for seizures and facial asymmetry. Hematological: Does not bruise/bleed easily. Psychiatric/Behavioral: Negative for behavioral problems, self-injury and sleep disturbance. All other systems reviewed and are negative.       Except as noted above the remainder of the review of systems was reviewed and negative. PAST MEDICAL HISTORY     Past Medical History:   Diagnosis Date    Allergic rhinitis     hayfever    Hypertension     Pilonidal cyst     Stroke [facility])     Tobacco use disorder          SURGICALHISTORY     No past surgical history on file.       CURRENT MEDICATIONS       Previous Medications    ASPIRIN 81 MG EC TABLET    Take 81 mg by mouth daily    CLOPIDOGREL (PLAVIX) 75 MG TABLET    Take 1 tablet by mouth daily    DONEPEZIL (ARICEPT) 5 MG TABLET    Take 1 tablet by mouth nightly    LOSARTAN (COZAAR) 100 MG TABLET    Take 1 tablet by mouth daily    METFORMIN (GLUCOPHAGE) 1000 MG TABLET    Take 1 tablet by mouth 2 times daily (with meals)    PRAVASTATIN (PRAVACHOL) 40 MG TABLET    Take 1 tablet by mouth daily       ALLERGIES     Sulfa antibiotics    FAMILY HISTORY       Family History   Problem Relation Age of Onset    Stroke Mother     High Blood Pressure Mother     High Blood Pressure Father     Stroke Maternal Grandmother     Stroke Maternal Grandfather     Liver Cancer Maternal Uncle           SOCIAL HISTORY       Social History     Socioeconomic History    Marital status:      Spouse name: Not on file    Number of children: Not on file    Years of education: Not on file    Highest education level: Not on file   Occupational History    Not on file   Tobacco Use    Smoking status: Former Smoker     Packs/day: 0.50     Years: 37.00     Pack years: 18.50     Types: Cigarettes     Start date: 1976     Quit date: 2013     Years since quittin.5    Smokeless tobacco: Never Used   Substance and Sexual Activity    Alcohol use: Yes     Comment: social - only small amount on Friday, sometimes heavier on Saturday    Drug use: Not on file    Sexual activity: Not on file   Other Topics Concern    Not on file   Social History Narrative    Not on file     Social Determinants of Health Financial Resource Strain: Low Risk     Difficulty of Paying Living Expenses: Not hard at all   Food Insecurity: No Food Insecurity    Worried About Running Out of Food in the Last Year: Never true    Alfred of Food in the Last Year: Never true   Transportation Needs: No Transportation Needs    Lack of Transportation (Medical): No    Lack of Transportation (Non-Medical): No   Physical Activity:     Days of Exercise per Week:     Minutes of Exercise per Session:    Stress:     Feeling of Stress :    Social Connections:     Frequency of Communication with Friends and Family:     Frequency of Social Gatherings with Friends and Family:     Attends Restorationism Services:     Active Member of Clubs or Organizations:     Attends Club or Organization Meetings:     Marital Status:    Intimate Partner Violence:     Fear of Current or Ex-Partner:     Emotionally Abused:     Physically Abused:     Sexually Abused:        SCREENINGS      @FLOW([id number])@      PHYSICAL EXAM    (up to 7 for level 4, 8 or more for level 5)     ED Triage Vitals   BP Temp Temp Source Pulse Resp SpO2 Height Weight   06/01/21 2103 06/01/21 2102 06/01/21 2102 06/01/21 2102 06/01/21 2102 06/01/21 2102 06/01/21 2102 06/01/21 2102   (!) 167/101 99.2 °F (37.3 °C) Temporal 85 16 97 % 5' 10\" (1.778 m) 208 lb (94.3 kg)       Physical Exam  Vitals and nursing note reviewed. Constitutional:       General: He is not in acute distress. Appearance: He is well-developed. HENT:      Head: Normocephalic and atraumatic. Right Ear: External ear normal.      Left Ear: External ear normal.      Nose: Nose normal.      Mouth/Throat:      Mouth: Mucous membranes are moist.   Eyes:      General:         Right eye: No discharge. Left eye: No discharge. Pupils: Pupils are equal, round, and reactive to light. Cardiovascular:      Rate and Rhythm: Normal rate and regular rhythm. Heart sounds: Normal heart sounds.    Pulmonary: Effort: Pulmonary effort is normal. No respiratory distress. Breath sounds: Normal breath sounds. No stridor. Abdominal:      General: Bowel sounds are normal. There is no distension. Palpations: Abdomen is soft. Musculoskeletal:         General: Normal range of motion. Cervical back: Normal range of motion and neck supple. Skin:     General: Skin is warm. Findings: No erythema. Comments: Left index finger laceration 5 mm small amount of active bleeding stops with pressure   Neurological:      Mental Status: He is alert and oriented to person, place, and time. Psychiatric:         Mood and Affect: Mood normal.         DIAGNOSTIC RESULTS     EKG: All EKG's are interpreted by the Emergency Department Physician who either signs or Co-signsthis chart in the absence of a cardiologist.         RADIOLOGY:   Adron Meiers such as CT, Ultrasound and MRI are read by the radiologist. Plain radiographic images are visualized and preliminarily interpreted by the emergency physician with the below findings:         Interpretation per the Radiologist below, if available at the time ofthis note:    No orders to display         ED BEDSIDE ULTRASOUND:   Performed by ED Physician - none    LABS:  Labs Reviewed - No data to display    All other labs were within normal range or not returned as of this dictation. EMERGENCY DEPARTMENT COURSE and DIFFERENTIAL DIAGNOSIS/MDM:   Vitals:    Vitals:    06/01/21 2102 06/01/21 2103   BP:  (!) 167/101   Pulse: 85    Resp: 16    Temp: 99.2 °F (37.3 °C)    TempSrc: Temporal    SpO2: 97%    Weight: 208 lb (94.3 kg)    Height: 5' 10\" (1.778 m)             MDM    CRITICAL CARE TIME     CONSULTS:  None    PROCEDURES:  Unless otherwise noted below, none     Procedures    FINAL IMPRESSION      1.  Laceration of left index finger without damage to nail, foreign body presence unspecified, initial encounter          DISPOSITION/PLAN   DISPOSITION Discharge - Pending Orders Complete 06/01/2021 09:28:41 PM      PATIENT REFERRED TO:  Raya Arias MD  [address] [facility]  [phone]    Call in 1 day      [facility]) ED  [address] [facility] [address] [phone]    If symptoms worsen      DISCHARGE MEDICATIONS:  New Prescriptions    No medications on file          (Please note that portions of this note were completed with a voice recognition program.  Efforts were made to edit the dictations but occasionally words are mis-transcribed.)    Ash Thomas PA-C (electronically signed)  Attending Emergency Physician       Ash Thomas PA-C  06/01/21 [id number]

## 2021-06-02 NOTE — ED PROVIDER NOTES
[address] [facility] ED  eMERGENCY dEPARTMENT eNCOUnter      Pt Name: Aurora Glass  MRN: [id number]  [place] 1955  Date of evaluation: 6/1/2021  Provider: Rayray Quiros PA-C    CHIEF COMPLAINT       Chief Complaint   Patient presents with    Laceration     cut finger at noon, cant get bleeding to stop         HISTORY OF PRESENT ILLNESS   (Location/Symptom, Timing/Onset,Context/Setting, Quality, Duration, Modifying Factors, Severity)  Note limiting factors. Aurora Glass is a 60 y.o. male who presents to the emergency department patient cut left index finger at noon with a knife he has been washing it and removing bandage since. He continues to bleed when touched. Currently no active bleeding with dressing in place. Patient denies any additional injuries. Symptoms mild to moderate severity nothing proves worsen symptoms. HPI    NursingNotes were reviewed. REVIEW OF SYSTEMS    (2-9 systems for level 4, 10 or more for level 5)     Review of Systems   Constitutional: Negative for activity change, appetite change, fever and unexpected weight change. HENT: Negative for ear discharge, nosebleeds and voice change. Eyes: Negative for photophobia and discharge. Respiratory: Negative for apnea, cough and shortness of breath. Cardiovascular: Negative for chest pain. Gastrointestinal: Negative for abdominal distention, anal bleeding, nausea and vomiting. Endocrine: Negative for cold intolerance, heat intolerance and polyphagia. Genitourinary: Negative for genital sores. Musculoskeletal: Negative for joint swelling. Skin: Positive for wound. Negative for pallor. Allergic/Immunologic: Negative for immunocompromised state. Neurological: Negative for seizures and facial asymmetry. Hematological: Does not bruise/bleed easily. Psychiatric/Behavioral: Negative for behavioral problems, self-injury and sleep disturbance. All other systems reviewed and are negative.       Except as Financial Resource Strain: Low Risk     Difficulty of Paying Living Expenses: Not hard at all   Food Insecurity: No Food Insecurity    Worried About Running Out of Food in the Last Year: Never true    Alfred of Food in the Last Year: Never true   Transportation Needs: No Transportation Needs    Lack of Transportation (Medical): No    Lack of Transportation (Non-Medical): No   Physical Activity:     Days of Exercise per Week:     Minutes of Exercise per Session:    Stress:     Feeling of Stress :    Social Connections:     Frequency of Communication with Friends and Family:     Frequency of Social Gatherings with Friends and Family:     Attends Congregational Services:     Active Member of Clubs or Organizations:     Attends Club or Organization Meetings:     Marital Status:    Intimate Partner Violence:     Fear of Current or Ex-Partner:     Emotionally Abused:     Physically Abused:     Sexually Abused:        SCREENINGS      @FLOW([id number])@      PHYSICAL EXAM    (up to 7 for level 4, 8 or more for level 5)     ED Triage Vitals   BP Temp Temp Source Pulse Resp SpO2 Height Weight   06/01/21 2103 06/01/21 2102 06/01/21 2102 06/01/21 2102 06/01/21 2102 06/01/21 2102 06/01/21 2102 06/01/21 2102   (!) 167/101 99.2 °F (37.3 °C) Temporal 85 16 97 % 5' 10\" (1.778 m) 208 lb (94.3 kg)       Physical Exam  Vitals and nursing note reviewed. Constitutional:       General: He is not in acute distress. Appearance: He is well-developed. HENT:      Head: Normocephalic and atraumatic. Right Ear: External ear normal.      Left Ear: External ear normal.      Nose: Nose normal.      Mouth/Throat:      Mouth: Mucous membranes are moist.   Eyes:      General:         Right eye: No discharge. Left eye: No discharge. Pupils: Pupils are equal, round, and reactive to light. Cardiovascular:      Rate and Rhythm: Normal rate and regular rhythm. Heart sounds: Normal heart sounds.    Pulmonary: Effort: Pulmonary effort is normal. No respiratory distress. Breath sounds: Normal breath sounds. No stridor. Abdominal:      General: Bowel sounds are normal. There is no distension. Palpations: Abdomen is soft. Musculoskeletal:         General: Normal range of motion. Cervical back: Normal range of motion and neck supple. Skin:     General: Skin is warm. Findings: No erythema. Comments: Left index finger laceration 5 mm small amount of active bleeding stops with pressure   Neurological:      Mental Status: He is alert and oriented to person, place, and time. Psychiatric:         Mood and Affect: Mood normal.         DIAGNOSTIC RESULTS     EKG: All EKG's are interpreted by the Emergency Department Physician who either signs or Co-signsthis chart in the absence of a cardiologist.         RADIOLOGY:   Dom Tang such as CT, Ultrasound and MRI are read by the radiologist. Plain radiographic images are visualized and preliminarily interpreted by the emergency physician with the below findings:         Interpretation per the Radiologist below, if available at the time ofthis note:    No orders to display         ED BEDSIDE ULTRASOUND:   Performed by ED Physician - none    LABS:  Labs Reviewed - No data to display    All other labs were within normal range or not returned as of this dictation. EMERGENCY DEPARTMENT COURSE and DIFFERENTIAL DIAGNOSIS/MDM:   Vitals:    Vitals:    06/01/21 2102 06/01/21 2103   BP:  (!) 167/101   Pulse: 85    Resp: 16    Temp: 99.2 °F (37.3 °C)    TempSrc: Temporal    SpO2: 97%    Weight: 208 lb (94.3 kg)    Height: 5' 10\" (1.778 m)             MDM    CRITICAL CARE TIME     CONSULTS:  None    PROCEDURES:  Unless otherwise noted below, none     Procedures    FINAL IMPRESSION      1.  Laceration of left index finger without damage to nail, foreign body presence unspecified, initial encounter          DISPOSITION/PLAN   DISPOSITION Discharge - Pending

## 2021-08-20 ENCOUNTER — OFFICE VISIT (OUTPATIENT)
Dept: FAMILY MEDICINE CLINIC | Age: 66
End: 2021-08-20
Payer: MEDICARE

## 2021-08-20 VITALS
WEIGHT: 210 LBS | HEART RATE: 73 BPM | SYSTOLIC BLOOD PRESSURE: 130 MMHG | HEIGHT: 70 IN | OXYGEN SATURATION: 96 % | BODY MASS INDEX: 30.06 KG/M2 | DIASTOLIC BLOOD PRESSURE: 88 MMHG | TEMPERATURE: 97.6 F

## 2021-08-20 DIAGNOSIS — I63.9 CEREBROVASCULAR ACCIDENT (CVA), UNSPECIFIED MECHANISM (HCC): ICD-10-CM

## 2021-08-20 DIAGNOSIS — R41.3 MEMORY LOSS: Primary | ICD-10-CM

## 2021-08-20 DIAGNOSIS — I10 ESSENTIAL HYPERTENSION: ICD-10-CM

## 2021-08-20 DIAGNOSIS — E11.65 TYPE 2 DIABETES MELLITUS WITH HYPERGLYCEMIA, WITHOUT LONG-TERM CURRENT USE OF INSULIN (HCC): ICD-10-CM

## 2021-08-20 PROCEDURE — 1036F TOBACCO NON-USER: CPT | Performed by: FAMILY MEDICINE

## 2021-08-20 PROCEDURE — G8419 CALC BMI OUT NRM PARAM NOF/U: HCPCS | Performed by: FAMILY MEDICINE

## 2021-08-20 PROCEDURE — 3052F HG A1C>EQUAL 8.0%<EQUAL 9.0%: CPT | Performed by: FAMILY MEDICINE

## 2021-08-20 PROCEDURE — 4040F PNEUMOC VAC/ADMIN/RCVD: CPT | Performed by: FAMILY MEDICINE

## 2021-08-20 PROCEDURE — 99214 OFFICE O/P EST MOD 30 MIN: CPT | Performed by: FAMILY MEDICINE

## 2021-08-20 PROCEDURE — G8427 DOCREV CUR MEDS BY ELIG CLIN: HCPCS | Performed by: FAMILY MEDICINE

## 2021-08-20 PROCEDURE — 1123F ACP DISCUSS/DSCN MKR DOCD: CPT | Performed by: FAMILY MEDICINE

## 2021-08-20 PROCEDURE — 3017F COLORECTAL CA SCREEN DOC REV: CPT | Performed by: FAMILY MEDICINE

## 2021-08-20 PROCEDURE — 2022F DILAT RTA XM EVC RTNOPTHY: CPT | Performed by: FAMILY MEDICINE

## 2021-08-20 RX ORDER — ORAL SEMAGLUTIDE 7 MG/1
1 TABLET ORAL DAILY
Qty: 30 TABLET | Refills: 5 | Status: SHIPPED | OUTPATIENT
Start: 2021-08-20 | End: 2021-10-04

## 2021-08-20 NOTE — PROGRESS NOTES
Chief Complaint   Patient presents with    Annual Exam     check up       HPI:  Eulogio Rabago is a 73 y.o. male    Here with wife    Follow up    Hx CVA, HTN, dyslipidemia, diabetes    Last visit we had addressed memory issues  Plan was to pursue neurology and neuroimaging once had medicare  Now on insurance plan     Trouble with time, sometimes with reading comprehension  Concern about perhaps retaking meds    Due to recheck a1c     Has been feeling ok, ultimately     Lab Results   Component Value Date    LABA1C 8.7 (H) 2021     No results found for: EAG      Social History     Socioeconomic History    Marital status:      Spouse name: None    Number of children: None    Years of education: None    Highest education level: None   Occupational History    None   Tobacco Use    Smoking status: Former Smoker     Packs/day: 0.50     Years: 37.00     Pack years: 18.50     Types: Cigarettes     Start date: 1976     Quit date: 2013     Years since quittin.7    Smokeless tobacco: Never Used   Substance and Sexual Activity    Alcohol use: Yes     Comment: social - only small amount on Friday, sometimes heavier on Saturday    Drug use: None    Sexual activity: None   Other Topics Concern    None   Social History Narrative    None     Social Determinants of Health     Financial Resource Strain: Low Risk     Difficulty of Paying Living Expenses: Not hard at all   Food Insecurity: No Food Insecurity    Worried About [address] in the Last Year: Never true    [address] in the Last Year: Never true   Transportation Needs: No Transportation Needs    Lack of Transportation (Medical): No    Lack of Transportation (Non-Medical):  No   Physical Activity:     Days of Exercise per Week:     Minutes of Exercise per Session:    Stress:     Feeling of Stress :    Social Connections:     Frequency of Communication with Friends and Family:     Frequency of Social Gatherings with Friends and Family:     Attends Druze Services:     Active Member of Clubs or Organizations:     Attends Club or Organization Meetings:     Marital Status:    Intimate Partner Violence:     Fear of Current or Ex-Partner:     Emotionally Abused:     Physically Abused:     Sexually Abused:          MRI from past   IMPRESSION ACUTE NONHEMORRHAGIC AREA OF ISCHEMIC INFARCTION INVOLVING THE POSTERIOR LEFT BASAL GANGLIA  [address] Readings from Last 3 Encounters:   08/20/21 210 lb (95.3 kg)   06/01/21 208 lb (94.3 kg)   04/20/21 207 lb 12.8 oz (94.3 kg)       Patient Active Problem List   Diagnosis    Allergic rhinitis    Hypertension    Stroke ([facility] [address]    Pilonidal cyst       Current Outpatient Medications   Medication Sig Dispense Refill    Semaglutide (RYBELSUS) 7 MG TABS Take 1 tablet by mouth daily 30 tablet 5    donepezil (ARICEPT) 5 MG tablet Take 1 tablet by mouth nightly 30 tablet 5    aspirin 81 MG EC tablet Take 81 mg by mouth daily      clopidogrel (PLAVIX) 75 MG tablet Take 1 tablet by mouth daily 30 tablet 11    metFORMIN (GLUCOPHAGE) 1000 MG tablet Take 1 tablet by mouth 2 times daily (with meals) 60 tablet 5    pravastatin (PRAVACHOL) 40 MG tablet Take 1 tablet by mouth daily 30 tablet 11    losartan (COZAAR) 100 MG tablet Take 1 tablet by mouth daily 30 tablet 11     No current facility-administered medications for this visit. Patient's medications, allergies, past medical, surgical, social and family histories were reviewed and updated as appropriate.     Review of Systems:   General ROS: negative for - chills, fatigue, fever, malaise, weight gain or weight loss  Respiratory ROS: no cough, shortness of breath, or wheezing  Cardiovascular ROS: no chest pain or dyspnea on exertion  Gastrointestinal ROS: no abdominal pain, change in bowel habits, or black or bloody stools  Genito-Urinary ROS: no dysuria, trouble voiding  Musculoskeletal ROS: negative for - gait disturbance, joint pain or joint stiffness  Neurological ROS: normal speech    In general patient otherwise reports feeling well. Physical Exam:  /88 (Site: Left Upper Arm)   Pulse 73   Temp 97.6 °F (36.4 °C)   Ht 5' 10\" (1.778 m)   Wt 210 lb (95.3 kg)   SpO2 96%   BMI 30.13 kg/m²     Gen: Well, NAD, Alert, Oriented x 3   HEENT: EOMI, eyes clear, MMM  Skin: without rash or jaundice  Neck: no significant lymphadenopathy or thyromegaly  Lungs: CTA B w/out Rales/Wheezes/Rhonchi, Good respiratory effort   Heart: RRR, S1S2, w/out M/R/G, non-displaced PMI   Abdomen: Soft NT/ND, w/out R/G, w/ +BSx4   Ext: No C/C/E Bilaterally. Neuro: Neurovascularly intact w/ Sensory/Motor intact UE/LE Bilaterally. Lab Results   Component Value Date    WBC 7.3 11/20/2015    HGB 15.7 11/20/2015    HCT 48.9 11/20/2015     11/20/2015    CHOL 203 (H) 12/13/2020    TRIG 107 12/13/2020    HDL 48 12/13/2020    ALT 43 (H) 12/13/2020    AST 24 12/13/2020     12/13/2020    K 4.2 12/13/2020     12/13/2020    CREATININE 0.94 12/13/2020    BUN 11 12/13/2020    CO2 27 12/13/2020    TSH 2.650 12/13/2020    PSA 1.52 12/13/2020    INR 1.0 11/17/2013    LABA1C 8.7 (H) 04/20/2021         A&P   Diagnosis Orders   1. Memory loss  MRI Toyna Santiago MD, Neurology, [place]   2. Type 2 diabetes mellitus with hyperglycemia, without long-term current use of insulin (HCC)  Hemoglobin A1C    Semaglutide (RYBELSUS) 7 MG TABS   3. Cerebrovascular accident (CVA), unspecified mechanism [facility])  Haja Tovar MD, Neurology, [place]   4.  Essential hypertension        Stay active     Continue to monitor bp at home  Hasn't been checking  He has remained smoke free    Neuroimaging and neurology referral     rybelsus to start     Recheck 3-4 months     Moderate MDM    Giovanni Kelly MD

## 2021-09-08 ENCOUNTER — HOSPITAL ENCOUNTER (OUTPATIENT)
Dept: MRI IMAGING | Age: 66
Discharge: HOME | End: 2021-09-10
Payer: MEDICARE

## 2021-09-08 DIAGNOSIS — R41.3 MEMORY LOSS: ICD-10-CM

## 2021-09-08 DIAGNOSIS — I63.9 CEREBROVASCULAR ACCIDENT (CVA), UNSPECIFIED MECHANISM (HCC): ICD-10-CM

## 2021-09-08 PROCEDURE — 70551 MRI BRAIN STEM W/O DYE: CPT

## 2021-10-04 ENCOUNTER — OFFICE VISIT (OUTPATIENT)
Dept: FAMILY MEDICINE CLINIC | Age: 66
End: 2021-10-04
Payer: MEDICARE

## 2021-10-04 VITALS
TEMPERATURE: 97.9 F | BODY MASS INDEX: 30.06 KG/M2 | WEIGHT: 210 LBS | SYSTOLIC BLOOD PRESSURE: 132 MMHG | OXYGEN SATURATION: 97 % | HEIGHT: 70 IN | HEART RATE: 69 BPM | DIASTOLIC BLOOD PRESSURE: 82 MMHG

## 2021-10-04 DIAGNOSIS — F41.9 ANXIETY: ICD-10-CM

## 2021-10-04 DIAGNOSIS — I63.9 CEREBROVASCULAR ACCIDENT (CVA), UNSPECIFIED MECHANISM (HCC): ICD-10-CM

## 2021-10-04 DIAGNOSIS — F01.50 VASCULAR DEMENTIA WITHOUT BEHAVIORAL DISTURBANCE (HCC): ICD-10-CM

## 2021-10-04 DIAGNOSIS — E11.65 TYPE 2 DIABETES MELLITUS WITH HYPERGLYCEMIA, WITHOUT LONG-TERM CURRENT USE OF INSULIN (HCC): Primary | ICD-10-CM

## 2021-10-04 DIAGNOSIS — R41.3 MEMORY LOSS: ICD-10-CM

## 2021-10-04 DIAGNOSIS — I10 ESSENTIAL HYPERTENSION: ICD-10-CM

## 2021-10-04 PROCEDURE — 99214 OFFICE O/P EST MOD 30 MIN: CPT | Performed by: FAMILY MEDICINE

## 2021-10-04 PROCEDURE — 4040F PNEUMOC VAC/ADMIN/RCVD: CPT | Performed by: FAMILY MEDICINE

## 2021-10-04 PROCEDURE — G8427 DOCREV CUR MEDS BY ELIG CLIN: HCPCS | Performed by: FAMILY MEDICINE

## 2021-10-04 PROCEDURE — 1036F TOBACCO NON-USER: CPT | Performed by: FAMILY MEDICINE

## 2021-10-04 PROCEDURE — 3046F HEMOGLOBIN A1C LEVEL >9.0%: CPT | Performed by: FAMILY MEDICINE

## 2021-10-04 PROCEDURE — 3017F COLORECTAL CA SCREEN DOC REV: CPT | Performed by: FAMILY MEDICINE

## 2021-10-04 PROCEDURE — G8484 FLU IMMUNIZE NO ADMIN: HCPCS | Performed by: FAMILY MEDICINE

## 2021-10-04 PROCEDURE — G8417 CALC BMI ABV UP PARAM F/U: HCPCS | Performed by: FAMILY MEDICINE

## 2021-10-04 PROCEDURE — 1123F ACP DISCUSS/DSCN MKR DOCD: CPT | Performed by: FAMILY MEDICINE

## 2021-10-04 PROCEDURE — 2022F DILAT RTA XM EVC RTNOPTHY: CPT | Performed by: FAMILY MEDICINE

## 2021-10-04 RX ORDER — PIOGLITAZONEHYDROCHLORIDE 15 MG/1
15 TABLET ORAL DAILY
Qty: 30 TABLET | Refills: 3 | Status: SHIPPED | OUTPATIENT
Start: 2021-10-04 | End: 2021-11-26 | Stop reason: SDUPTHER

## 2021-10-04 RX ORDER — ESCITALOPRAM OXALATE 5 MG/1
5 TABLET ORAL DAILY
Qty: 30 TABLET | Refills: 5 | Status: SHIPPED | OUTPATIENT
Start: 2021-10-04 | End: 2021-12-22

## 2021-10-04 RX ORDER — GLIMEPIRIDE 2 MG/1
2 TABLET ORAL
Qty: 30 TABLET | Refills: 5 | Status: CANCELLED | OUTPATIENT
Start: 2021-10-04

## 2021-10-04 RX ORDER — DONEPEZIL HYDROCHLORIDE 5 MG/1
5 TABLET, FILM COATED ORAL NIGHTLY
Qty: 30 TABLET | Refills: 5 | Status: SHIPPED | OUTPATIENT
Start: 2021-10-04 | End: 2021-11-15

## 2021-10-04 NOTE — PROGRESS NOTES
Chief Complaint   Patient presents with    Heat Exposure     follow up CT scan from previous stroke     Discuss Medications     Reblysus too expesive, wanting if a generic if possible     Immunizations     refused flu and pnemo vaccine    Health Maintenance     declined colon/ HIV screen       HPI:  Pino Oneal is a 73 y.o. male    Here with wife    Follow up    Hx CVA, HTN, dyslipidemia, diabetes    MRI showing changes of vascular dementia  Trouble with time, sometimes with reading comprehension  Concern about perhaps retaking meds    Has been feeling ok, ultimately     Anger outbursts at times per wife    Lab Results   Component Value Date    LABA1C 9.2 (H) 2021     No results found for: EAG      Social History     Socioeconomic History    Marital status:      Spouse name: None    Number of children: None    Years of education: None    Highest education level: None   Occupational History    None   Tobacco Use    Smoking status: Former Smoker     Packs/day: 0.50     Years: 37.00     Pack years: 18.50     Types: Cigarettes     Start date: 1976     Quit date: 2013     Years since quittin.9    Smokeless tobacco: Never Used   Substance and Sexual Activity    Alcohol use: Yes     Comment: social - only small amount on Friday, sometimes heavier on Saturday    Drug use: None    Sexual activity: None   Other Topics Concern    None   Social History Narrative    None     Social Determinants of Health     Financial Resource Strain: Low Risk     Difficulty of Paying Living Expenses: Not hard at all   Food Insecurity: No Food Insecurity    Worried About [address] [facility] in the Last Year: Never true    Alfred [employer] in the Last Year: Never true   Transportation Needs: No Transportation Needs    Lack of Transportation (Medical): No    Lack of Transportation (Non-Medical):  No   Physical Activity:     Days of Exercise per Week:     Minutes of Exercise per Session:    Stress:  Feeling of Stress :    Social Connections:     Frequency of Communication with Friends and Family:     Frequency of Social Gatherings with Friends and Family:     Attends Christian Services:     Active Member of Clubs or Organizations:     Attends Club or Organization Meetings:     Marital Status:    Intimate Partner Violence:     Fear of Current or Ex-Partner:     Emotionally Abused:     Physically Abused:     Sexually Abused:          MRI from past   IMPRESSION ACUTE NONHEMORRHAGIC AREA OF ISCHEMIC INFARCTION INVOLVING THE POSTERIOR LEFT BASAL GANGLIA  [address] Wt Readings from Last 3 Encounters:   10/04/21 210 lb (95.3 kg)   08/20/21 210 lb (95.3 kg)   06/01/21 208 lb (94.3 kg)       Patient Active Problem List   Diagnosis    Allergic rhinitis    Hypertension    [facility]    Pilonidal cyst       Current Outpatient Medications   Medication Sig Dispense Refill    donepezil (ARICEPT) 5 MG tablet Take 1 tablet by mouth nightly 30 tablet 5    aspirin 81 MG EC tablet Take 81 mg by mouth daily      clopidogrel (PLAVIX) 75 MG tablet Take 1 tablet by mouth daily 30 tablet 11    metFORMIN (GLUCOPHAGE) 1000 MG tablet Take 1 tablet by mouth 2 times daily (with meals) 60 tablet 5    pravastatin (PRAVACHOL) 40 MG tablet Take 1 tablet by mouth daily 30 tablet 11    losartan (COZAAR) 100 MG tablet Take 1 tablet by mouth daily 30 tablet 11     No current facility-administered medications for this visit. Patient's medications, allergies, past medical, surgical, social and family histories were reviewed and updated as appropriate.     Review of Systems:   General ROS: negative for - chills, fatigue, fever, malaise, weight gain or weight loss  Respiratory ROS: no cough, shortness of breath, or wheezing  Cardiovascular ROS: no chest pain or dyspnea on exertion  Gastrointestinal ROS: no abdominal pain, change in bowel habits, or black or bloody stools  Genito-Urinary ROS: no dysuria, trouble voiding  Musculoskeletal ROS: negative for - gait disturbance, joint pain or joint stiffness  Neurological ROS: normal speech    In general patient otherwise reports feeling well. Physical Exam:  /82   Pulse 69   Temp 97.9 °F (36.6 °C)   Ht 5' 10\" (1.778 m)   Wt 210 lb (95.3 kg)   SpO2 97%   BMI 30.13 kg/m²     Gen: Well, NAD, Alert, Oriented x 3   HEENT: EOMI, eyes clear, MMM  Skin: without rash or jaundice  Neck: no significant lymphadenopathy or thyromegaly  Lungs: CTA B w/out Rales/Wheezes/Rhonchi, Good respiratory effort   Heart: RRR, S1S2, w/out M/R/G, non-displaced PMI   Abdomen: Soft NT/ND, w/out R/G, w/ +BSx4   Ext: No C/C/E Bilaterally. Neuro: Neurovascularly intact w/ Sensory/Motor intact UE/LE Bilaterally. Lab Results   Component Value Date    WBC 7.3 11/20/2015    HGB 15.7 11/20/2015    HCT 48.9 11/20/2015     11/20/2015    CHOL 203 (H) 12/13/2020    TRIG 107 12/13/2020    HDL 48 12/13/2020    ALT 43 (H) 12/13/2020    AST 24 12/13/2020     12/13/2020    K 4.2 12/13/2020     12/13/2020    CREATININE 0.94 12/13/2020    BUN 11 12/13/2020    CO2 27 12/13/2020    TSH 2.650 12/13/2020    PSA 1.52 12/13/2020    INR 1.0 11/17/2013    LABA1C 9.2 (H) 08/20/2021         A&P   Diagnosis Orders   1. Type 2 diabetes mellitus with hyperglycemia, without long-term current use of insulin ([address]     2. Cerebrovascular accident (CVA), unspecified mechanism ([address]     3. Essential hypertension  Lipid Panel   4. Memory loss     5.  Vascular dementia without behavioral disturbance ([address]          Sulfonylurea contraindicated     Will try actos    Discussed his MRI results    rybelsus too costly    Neurology appt 10/27    aricept to 10  Add lexapro     Moderate MDM    Can Flood MD

## 2021-10-27 ENCOUNTER — TELEPHONE (OUTPATIENT)
Dept: FAMILY MEDICINE CLINIC | Age: 66
End: 2021-10-27

## 2021-10-27 ENCOUNTER — OFFICE VISIT (OUTPATIENT)
Dept: NEUROLOGY | Age: 66
End: 2021-10-27
Payer: MEDICARE

## 2021-10-27 VITALS
WEIGHT: 211 LBS | HEART RATE: 81 BPM | DIASTOLIC BLOOD PRESSURE: 86 MMHG | SYSTOLIC BLOOD PRESSURE: 138 MMHG | BODY MASS INDEX: 30.28 KG/M2

## 2021-10-27 DIAGNOSIS — I63.9 SMALL VESSEL CEREBROVASCULAR ACCIDENT (CVA) (HCC): ICD-10-CM

## 2021-10-27 DIAGNOSIS — F01.50 VASCULAR DEMENTIA WITHOUT BEHAVIORAL DISTURBANCE (HCC): Primary | ICD-10-CM

## 2021-10-27 DIAGNOSIS — I63.89 OTHER CEREBRAL INFARCTION (HCC): ICD-10-CM

## 2021-10-27 DIAGNOSIS — R41.3 MEMORY LOSS: ICD-10-CM

## 2021-10-27 PROBLEM — E11.9 TYPE 2 DIABETES MELLITUS (HCC): Status: ACTIVE | Noted: 2021-10-27

## 2021-10-27 PROCEDURE — G8417 CALC BMI ABV UP PARAM F/U: HCPCS | Performed by: PSYCHIATRY & NEUROLOGY

## 2021-10-27 PROCEDURE — G8484 FLU IMMUNIZE NO ADMIN: HCPCS | Performed by: PSYCHIATRY & NEUROLOGY

## 2021-10-27 PROCEDURE — 99204 OFFICE O/P NEW MOD 45 MIN: CPT | Performed by: PSYCHIATRY & NEUROLOGY

## 2021-10-27 PROCEDURE — 1123F ACP DISCUSS/DSCN MKR DOCD: CPT | Performed by: PSYCHIATRY & NEUROLOGY

## 2021-10-27 PROCEDURE — G8427 DOCREV CUR MEDS BY ELIG CLIN: HCPCS | Performed by: PSYCHIATRY & NEUROLOGY

## 2021-10-27 PROCEDURE — 1036F TOBACCO NON-USER: CPT | Performed by: PSYCHIATRY & NEUROLOGY

## 2021-10-27 PROCEDURE — 3017F COLORECTAL CA SCREEN DOC REV: CPT | Performed by: PSYCHIATRY & NEUROLOGY

## 2021-10-27 PROCEDURE — 4040F PNEUMOC VAC/ADMIN/RCVD: CPT | Performed by: PSYCHIATRY & NEUROLOGY

## 2021-10-27 RX ORDER — DONEPEZIL HYDROCHLORIDE 10 MG/1
10 TABLET, FILM COATED ORAL NIGHTLY
Qty: 30 TABLET | Refills: 3 | Status: SHIPPED | OUTPATIENT
Start: 2021-10-27 | End: 2021-10-27 | Stop reason: SDUPTHER

## 2021-10-27 RX ORDER — DONEPEZIL HYDROCHLORIDE 10 MG/1
10 TABLET, FILM COATED ORAL DAILY
Qty: 30 TABLET | Refills: 3 | Status: SHIPPED | OUTPATIENT
Start: 2021-10-27 | End: 2022-03-22 | Stop reason: SDUPTHER

## 2021-10-27 RX ORDER — MEMANTINE HYDROCHLORIDE 5 MG/1
TABLET ORAL
Qty: 60 TABLET | Refills: 0 | Status: SHIPPED | OUTPATIENT
Start: 2021-10-27 | End: 2021-11-20

## 2021-10-27 ASSESSMENT — ENCOUNTER SYMPTOMS
SHORTNESS OF BREATH: 0
TROUBLE SWALLOWING: 0
CHOKING: 0
PHOTOPHOBIA: 0
VOMITING: 0
BACK PAIN: 0
NAUSEA: 0
COLOR CHANGE: 0

## 2021-10-27 NOTE — PROGRESS NOTES
Subjective:      Patient ID: Sebastian Cochran is a 73 y.o. male who presents today for:  Chief Complaint   Patient presents with    New Patient     Pt's wife states that pt is having memory problems, and problems telling time. She says that the pt will say he hears her talking to him from another room but she isnt really spekaing to him. Onset of these things has been very noticable over the last 6 mnoths, but has been about the last year now. Pt had mri done and it showed blockage in the blood vessels. HPI 73 right-handed gentleman referred here for memory issues. Patient is already on Aricept 5 mg she takes at night. Patient has had symptoms going on for quite some time. He was last seen by me in 2013 when he had a small stroke. He has multiple risk factors for vascular disease. This did not accumulate into a vascular dementia. Patient over time has had significant progression of small vessel ischemic changes. He is already on dual antiplatelet therapy. The memory issues are going on for almost about a year. He has short-term memory issues as well as occasional hallucinations where he was his wife when she is not there this is not bothersome to him. He has no family history of dementia no history of head injury or trauma. Is still quite functional    Past Medical History:   Diagnosis Date    Allergic rhinitis     hayfever    Hypertension     Pilonidal cyst     Stroke [facility])     Tobacco use disorder      No past surgical history on file.   Social History     Socioeconomic History    Marital status:      Spouse name: Not on file    Number of children: Not on file    Years of education: Not on file    Highest education level: Not on file   Occupational History    Not on file   Tobacco Use    Smoking status: Former Smoker     Packs/day: 0.50     Years: 37.00     Pack years: 18.50     Types: Cigarettes     Start date: 1/1/1976     Quit date: 11/11/2013     Years since quitting: 7.9    Smokeless tobacco: Never Used   Substance and Sexual Activity    Alcohol use: Yes     Comment: social - only small amount on Friday, sometimes heavier on Saturday    Drug use: Not on file    Sexual activity: Not on file   Other Topics Concern    Not on file   Social History Narrative    Not on file     Social Determinants of Health     Financial Resource Strain: Low Risk     Difficulty of Paying Living Expenses: Not hard at all   Food Insecurity: No Food Insecurity    Worried About [address] [employer] in the Last Year: Never true    [address] [employer] in the Last Year: Never true   Transportation Needs: No Transportation Needs    Lack of Transportation (Medical): No    Lack of Transportation (Non-Medical):  No   Physical Activity:     Days of Exercise per Week:     Minutes of Exercise per Session:    Stress:     Feeling of Stress :    Social Connections:     Frequency of Communication with Friends and Family:     Frequency of Social Gatherings with Friends and Family:     Attends Confucianist Services:     Active Member of Clubs or Organizations:     Attends Club or Organization Meetings:     Marital Status:    Intimate Partner Violence:     Fear of Current or Ex-Partner:     Emotionally Abused:     Physically Abused:     Sexually Abused:      Family History   Problem Relation Age of Onset    Stroke Mother     High Blood Pressure Mother     High Blood Pressure Father     Stroke Maternal Grandmother     Stroke Maternal Grandfather     Liver Cancer Maternal Uncle      Allergies   Allergen Reactions    Sulfa Antibiotics        Current Outpatient Medications   Medication Sig Dispense Refill    pioglitazone (ACTOS) 15 MG tablet Take 1 tablet by mouth daily 30 tablet 3    donepezil (ARICEPT) 5 MG tablet Take 1 tablet by mouth nightly 30 tablet 5    escitalopram (LEXAPRO) 5 MG tablet Take 1 tablet by mouth daily 30 tablet 5    aspirin 81 MG EC tablet Take 81 mg by mouth daily      clopidogrel (PLAVIX) 75 MG tablet Take 1 tablet by mouth daily 30 tablet 11    metFORMIN (GLUCOPHAGE) 1000 MG tablet Take 1 tablet by mouth 2 times daily (with meals) 60 tablet 5    pravastatin (PRAVACHOL) 40 MG tablet Take 1 tablet by mouth daily 30 tablet 11    losartan (COZAAR) 100 MG tablet Take 1 tablet by mouth daily 30 tablet 11    donepezil (ARICEPT) 10 MG tablet Take 1 tablet by mouth nightly (Patient not taking: Reported on 10/27/2021) 30 tablet 3     No current facility-administered medications for this visit. Review of Systems   Constitutional: Negative for fever. HENT: Negative for ear pain, tinnitus and trouble swallowing. Eyes: Negative for photophobia and visual disturbance. Respiratory: Negative for choking and shortness of breath. Cardiovascular: Negative for chest pain and palpitations. Gastrointestinal: Negative for nausea and vomiting. Musculoskeletal: Negative for back pain, gait problem, joint swelling, myalgias, neck pain and neck stiffness. Skin: Negative for color change. Allergic/Immunologic: Negative for food allergies. Neurological: Negative for dizziness, tremors, seizures, syncope, facial asymmetry, speech difficulty, weakness, light-headedness, numbness and headaches. Psychiatric/Behavioral: Negative for behavioral problems, confusion, hallucinations and sleep disturbance. Objective:   /86 (Site: Left Upper Arm, Position: Sitting, Cuff Size: Medium Adult)   Pulse 81   Wt 211 lb (95.7 kg)   BMI 30.28 kg/m²     Physical Exam  Vitals reviewed. Eyes:      Pupils: Pupils are equal, round, and reactive to light. Cardiovascular:      Rate and Rhythm: Normal rate and regular rhythm. Heart sounds: No murmur heard. Pulmonary:      Effort: Pulmonary effort is normal.      Breath sounds: Normal breath sounds. Abdominal:      General: Bowel sounds are normal.   Musculoskeletal:         General: Normal range of motion.       Cervical back: Normal range of motion. Skin:     General: Skin is warm. Neurological:      Mental Status: He is alert and oriented to person, place, and time. Cranial Nerves: No cranial nerve deficit. Sensory: No sensory deficit. Motor: No abnormal muscle tone. Coordination: Coordination normal.      Deep Tendon Reflexes: Reflexes are normal and symmetric. Babinski sign absent on the right side. Babinski sign absent on the left side. Psychiatric:         Mood and Affect: Mood normal.     Areflexia at the ankles patient is mildly hoarse in his voice and dysarthric and Mini-Mental examination score of 20    MRI BRAIN WO CONTRAST    Result Date: 9/9/2021  EXAMINATION: MRI BRAIN WO CONTRAST DATE AND TIME:9/8/2021 2:38 PM CLINICAL HISTORY: HEADACHE  R41.3 MEMORY LOSS ICD10 COMPARISON: NOVEMBER 18, 2013 TECHNIQUE:  Multi-sequence multiplanar imaging of the brain was performed. Sequences included T1-weighted, T2-weighted, FLAIR, diffusion-weighted, ADC maps, and  susceptibility weighted imaging. VOLUME: None   MR CONTRAST: None FINDINGS Acute change: There is some punctate areas of increased diffusion weighted signal involving periventricular and subcortical white matter foci bilaterally but these do not demonstrate diffusion restriction to indicate acute infarcts. These demonstrate T2 shine through and are consistent with remote ischemic areas. No acute infarct. Magnetic susceptibility:There are no areas of abnormal magnetic susceptibility to suggest the presence of any acute blood products. Ventricles: Ventricles and sulci are age-appropriate. Brain parenchyma There are bilateral periventricular confluent and subcortical white matter hyperintensities likely related to chronic microvascular ischemic change. This represents significant disease progression when compared to the previous MRI. Mass: No mass or mass effect.  No extra-axial fluid                                                 Brainstem: Remote ischemic foci in the brady bilaterally. Skull base: Cerebellar tonsils are normally positioned. No evidence of a marrow replacement process. Vasculature: The major intracranial arterial structures and dural venous sinuses showed typical flow void, suggesting patency by spin-echo criteria. Sinuses: The  mastoids and visualized paranasal sinuses are clear. SIGNIFICANT PROGRESSION OF SMALL VESSEL ISCHEMIC CHANGES. Lab Results   Component Value Date    WBC 7.3 11/20/2015    RBC 5.09 11/20/2015    HGB 15.7 11/20/2015    HCT 48.9 11/20/2015    MCV 96.0 11/20/2015    MCH 30.8 11/20/2015    MCHC 32.1 11/20/2015    RDW 13.0 11/20/2015     11/20/2015    MPV 8.9 11/19/2013     Lab Results   Component Value Date     12/13/2020    K 4.2 12/13/2020     12/13/2020    CO2 27 12/13/2020    BUN 11 12/13/2020    CREATININE 0.94 12/13/2020    GFRAA >60.0 12/13/2020    LABGLOM >60.0 12/13/2020    GLUCOSE 307 12/13/2020    PROT 7.0 12/13/2020    LABALBU 4.8 12/13/2020    CALCIUM 9.6 12/13/2020    BILITOT 0.6 12/13/2020    ALKPHOS 88 12/13/2020    AST 24 12/13/2020    ALT 43 12/13/2020     Lab Results   Component Value Date    PROTIME 10.2 11/17/2013    INR 1.0 11/17/2013     Lab Results   Component Value Date    TSH 2.650 12/13/2020     Lab Results   Component Value Date    TRIG 107 12/13/2020    HDL 48 12/13/2020    LDLCALC 134 12/13/2020     Lab Results   Component Value Date    LABAMPH Negative 11/17/2013    BARBSCNU Negative 11/17/2013    LABBENZ Negative 11/17/2013    OPIATESCREENURINE Negative 11/17/2013    PHENCYCLIDINESCREENURINE Negative 11/17/2013     No results found for: LITHIUM, DILFRTOT, VALPROATE    Assessment:       Diagnosis Orders   1. Vascular dementia without behavioral disturbance ([facility] [address]     2. Small vessel cerebrovascular accident (CVA) ([facility] [address]     3. Memory loss     Vascular dementia from the scores obtained and evaluation of his brain MRI. He does have fluctuating course.   Is on Aricept 5 mg I recommended that we change this to 10 mg. We also added Namenda 5 mg twice a day to maximize what ever we have so we can prevent his progression. Recommended that he be active in water he likes and keep sleep-wake cycles intact. Patient does have some hallucinations which may respond to [address] Patient had a previous cerebrovascular event in 2013 when I see media small stroke in his baseline dysarthria. We have discussed the findings and expectations and will continue to follow. Plan:      No orders of the defined types were placed in this encounter. No orders of the defined types were placed in this encounter. No follow-ups on file.       Richard Raines MD

## 2021-10-27 NOTE — TELEPHONE ENCOUNTER
Pt's wife is calling stating that the aricept was not changed to 10 per ov. Please send over corrected script to [facility].

## 2021-10-27 NOTE — TELEPHONE ENCOUNTER
Orders Placed This Encounter   Medications    donepezil (ARICEPT) 10 MG tablet     Sig: Take 1 tablet by mouth nightly     Dispense:  30 tablet     Refill:  3       The above med(s) were e-scripted to the patient's pharmacy.    Please advise patient  Mendel Latif MD

## 2021-11-15 ENCOUNTER — OFFICE VISIT (OUTPATIENT)
Dept: FAMILY MEDICINE CLINIC | Age: 66
End: 2021-11-15
Payer: MEDICARE

## 2021-11-15 VITALS
OXYGEN SATURATION: 96 % | HEIGHT: 70 IN | HEART RATE: 81 BPM | TEMPERATURE: 98 F | SYSTOLIC BLOOD PRESSURE: 136 MMHG | WEIGHT: 212 LBS | DIASTOLIC BLOOD PRESSURE: 78 MMHG | BODY MASS INDEX: 30.35 KG/M2

## 2021-11-15 DIAGNOSIS — E11.65 TYPE 2 DIABETES MELLITUS WITH HYPERGLYCEMIA, WITHOUT LONG-TERM CURRENT USE OF INSULIN (HCC): ICD-10-CM

## 2021-11-15 DIAGNOSIS — F41.9 ANXIETY: ICD-10-CM

## 2021-11-15 DIAGNOSIS — I63.00 CEREBROVASCULAR ACCIDENT (CVA) DUE TO THROMBOSIS OF PRECEREBRAL ARTERY (HCC): ICD-10-CM

## 2021-11-15 DIAGNOSIS — I63.9 CEREBROVASCULAR ACCIDENT (CVA), UNSPECIFIED MECHANISM (HCC): ICD-10-CM

## 2021-11-15 DIAGNOSIS — R41.3 MEMORY LOSS: ICD-10-CM

## 2021-11-15 DIAGNOSIS — Z11.59 ENCOUNTER FOR HEPATITIS C SCREENING TEST FOR LOW RISK PATIENT: ICD-10-CM

## 2021-11-15 DIAGNOSIS — E11.65 TYPE 2 DIABETES MELLITUS WITH HYPERGLYCEMIA, WITHOUT LONG-TERM CURRENT USE OF INSULIN (HCC): Primary | ICD-10-CM

## 2021-11-15 DIAGNOSIS — Z23 NEEDS FLU SHOT: ICD-10-CM

## 2021-11-15 DIAGNOSIS — F01.50 VASCULAR DEMENTIA WITHOUT BEHAVIORAL DISTURBANCE (HCC): ICD-10-CM

## 2021-11-15 DIAGNOSIS — I10 ESSENTIAL HYPERTENSION: ICD-10-CM

## 2021-11-15 LAB
CREATININE URINE: 139.4 MG/DL
HEPATITIS C ANTIBODY INTERPRETATION: NORMAL
MICROALBUMIN UR-MCNC: 4.1 MG/DL
MICROALBUMIN/CREAT UR-RTO: 29.4 MG/G (ref 0–30)

## 2021-11-15 PROCEDURE — 3046F HEMOGLOBIN A1C LEVEL >9.0%: CPT | Performed by: FAMILY MEDICINE

## 2021-11-15 PROCEDURE — 1123F ACP DISCUSS/DSCN MKR DOCD: CPT | Performed by: FAMILY MEDICINE

## 2021-11-15 PROCEDURE — 3017F COLORECTAL CA SCREEN DOC REV: CPT | Performed by: FAMILY MEDICINE

## 2021-11-15 PROCEDURE — 99214 OFFICE O/P EST MOD 30 MIN: CPT | Performed by: FAMILY MEDICINE

## 2021-11-15 PROCEDURE — G8427 DOCREV CUR MEDS BY ELIG CLIN: HCPCS | Performed by: FAMILY MEDICINE

## 2021-11-15 PROCEDURE — G8417 CALC BMI ABV UP PARAM F/U: HCPCS | Performed by: FAMILY MEDICINE

## 2021-11-15 PROCEDURE — 4040F PNEUMOC VAC/ADMIN/RCVD: CPT | Performed by: FAMILY MEDICINE

## 2021-11-15 PROCEDURE — 1036F TOBACCO NON-USER: CPT | Performed by: FAMILY MEDICINE

## 2021-11-15 PROCEDURE — G0008 ADMIN INFLUENZA VIRUS VAC: HCPCS | Performed by: FAMILY MEDICINE

## 2021-11-15 PROCEDURE — 2022F DILAT RTA XM EVC RTNOPTHY: CPT | Performed by: FAMILY MEDICINE

## 2021-11-15 PROCEDURE — G8484 FLU IMMUNIZE NO ADMIN: HCPCS | Performed by: FAMILY MEDICINE

## 2021-11-15 PROCEDURE — 90694 VACC AIIV4 NO PRSRV 0.5ML IM: CPT | Performed by: FAMILY MEDICINE

## 2021-11-15 NOTE — PROGRESS NOTES
Chief Complaint   Patient presents with    Diabetes     6 weeks, thier cuff 164/94       HPI:  Julian Casillas is a 73 y.o. male    Here with wife    Follow up    Home cuff reading higher    I re-checked manually, 136/78      Hx CVA, HTN, dyslipidemia, diabetes    MRI showing changes of vascular dementia  Trouble with time, sometimes with reading comprehension  Concern about perhaps retaking meds    Did see Dr. Katja Tamayo recently   aricept increased  namenda added     Has been feeling ok, ultimately     Anger outbursts at times per wife    Lab Results   Component Value Date    LABA1C 9.2 (H) 2021     No results found for: EAG      Social History     Socioeconomic History    Marital status:      Spouse name: None    Number of children: None    Years of education: None    Highest education level: None   Occupational History    None   Tobacco Use    Smoking status: Former Smoker     Packs/day: 0.50     Years: 37.00     Pack years: 18.50     Types: Cigarettes     Start date: 1976     Quit date: 2013     Years since quittin.0    Smokeless tobacco: Never Used   Substance and Sexual Activity    Alcohol use: Yes     Comment: social - only small amount on Friday, sometimes heavier on Saturday    Drug use: None    Sexual activity: None   Other Topics Concern    None   Social History Narrative    None     Social Determinants of Health     Financial Resource Strain: Low Risk     Difficulty of Paying Living Expenses: Not hard at all   Food Insecurity: No Food Insecurity    Worried About [address] in the Last Year: Never true    [address] [facility] [address] in the Last Year: Never true   Transportation Needs: No Transportation Needs    Lack of Transportation (Medical): No    Lack of Transportation (Non-Medical):  No   Physical Activity:     Days of Exercise per Week: Not on file    Minutes of Exercise per Session: Not on file   Stress:     Feeling of Stress : Not on file   Social Connections:     Frequency of Communication with Friends and Family: Not on file    Frequency of Social Gatherings with Friends and Family: Not on file    Attends Pentecostalism Services: Not on file    Active Member of Clubs or Organizations: Not on file    Attends Club or Organization Meetings: Not on file    Marital Status: Not on file   Intimate Partner Violence:     Fear of Current or Ex-Partner: Not on file    Emotionally Abused: Not on file    Physically Abused: Not on file    Sexually Abused: Not on file   Housing Stability:     Unable to Pay for Housing in the Last Year: Not on file    Number of [place] in the Last Year: Not on file    Unstable Housing in the Last Year: Not on file         MRI from past   IMPRESSION ACUTE [address]     Wt Readings from Last 3 Encounters:   11/15/21 212 lb (96.2 kg)   10/27/21 211 lb (95.7 kg)   10/04/21 210 lb (95.3 kg)       Patient Active Problem List   Diagnosis    Allergic rhinitis    Hypertension    [facility] [address] [facility]    Pilonidal cyst    Type 2 diabetes mellitus    Vascular dementia without behavioral disturbance (HCC)    Memory loss       Current Outpatient Medications   Medication Sig Dispense Refill    donepezil (ARICEPT) 10 MG tablet Take 1 tablet by mouth daily q  am 30 tablet 3    memantine (NAMENDA) 5 MG tablet 1 daily in the morning for 2 weeks and then 1 twice daily 60 tablet 0    pioglitazone (ACTOS) 15 MG tablet Take 1 tablet by mouth daily 30 tablet 3    escitalopram (LEXAPRO) 5 MG tablet Take 1 tablet by mouth daily 30 tablet 5    aspirin 81 MG EC tablet Take 81 mg by mouth daily      clopidogrel (PLAVIX) 75 MG tablet Take 1 tablet by mouth daily 30 tablet 11    metFORMIN (GLUCOPHAGE) 1000 MG tablet Take 1 tablet by mouth 2 times daily (with meals) 60 tablet 5    pravastatin (PRAVACHOL) 40 MG tablet Take 1 tablet by mouth daily 30 tablet 11    losartan (COZAAR) 100 MG tablet Take 1 tablet by mouth daily 30 tablet 11     No current facility-administered medications for this visit. Patient's medications, allergies, past medical, surgical, social and family histories were reviewed and updated as appropriate. Review of Systems:   General ROS: negative for - chills, fatigue, fever, malaise, weight gain or weight loss  Respiratory ROS: no cough, shortness of breath, or wheezing  Cardiovascular ROS: no chest pain or dyspnea on exertion  Gastrointestinal ROS: no abdominal pain, change in bowel habits, or black or bloody stools  Genito-Urinary ROS: no dysuria, trouble voiding  Musculoskeletal ROS: negative for - gait disturbance, joint pain or joint stiffness  Neurological ROS: normal speech     In general patient otherwise reports feeling well. Physical Exam:  /78 (Site: Right Upper Arm)   Pulse 81   Temp 98 °F (36.7 °C)   Ht 5' 10\" (1.778 m)   Wt 212 lb (96.2 kg)   SpO2 96%   BMI 30.42 kg/m²     Gen: Well, NAD, Alert, Oriented x 3   HEENT: EOMI, eyes clear, MMM  Skin: without rash or jaundice  Neck: no significant lymphadenopathy or thyromegaly  Lungs: CTA B w/out Rales/Wheezes/Rhonchi, Good respiratory effort   Heart: RRR, S1S2, w/out M/R/G, non-displaced PMI   Abdomen: Soft NT/ND, w/out R/G, w/ +BSx4   Ext: No C/C/E Bilaterally. Neuro: Neurovascularly intact w/ Sensory/Motor intact UE/LE Bilaterally.             Lab Results   Component Value Date    WBC 7.3 11/20/2015    HGB 15.7 11/20/2015    HCT 48.9 11/20/2015     11/20/2015    CHOL 203 (H) 12/13/2020    TRIG 107 12/13/2020    HDL 48 12/13/2020    ALT 43 (H) 12/13/2020    AST 24 12/13/2020     12/13/2020    K 4.2 12/13/2020     12/13/2020    CREATININE 0.94 12/13/2020    BUN 11 12/13/2020    CO2 27 12/13/2020    TSH 2.650 12/13/2020    PSA 1.52 12/13/2020    INR 1.0 11/17/2013    LABA1C 9.2 (H) 08/20/2021 A&P   Diagnosis Orders   1. Type 2 diabetes mellitus with hyperglycemia, without long-term current use of insulin (HCC)  Hemoglobin A1C    Microalbumin / Creatinine Urine Ratio   2. Cerebrovascular accident (CVA), unspecified mechanism ([facility] [address]     3. Essential hypertension     4. Memory loss     5. Vascular dementia without behavioral disturbance ([facility] [address]     6. Anxiety     7. Cerebrovascular accident (CVA) due to thrombosis of precerebral artery ([address]     8. Encounter for hepatitis C screening test for low risk patient  Hepatitis C Antibody   9.  Needs flu shot  INFLUENZA, QUADV, ADJUVANTED, 65 YRS =, IM, PF, PREFILL SYR, 0.5ML (FLUAD)        Sulfonylurea contraindicated     Flu shot     Not checking sugars    Neurology appt 10/27    aricept  10   lexapro   namenda    Moderate MDM    Army Tristian MD

## 2021-11-15 NOTE — PROGRESS NOTES
Vaccine Information Sheet, \"Influenza - Inactivated\"  given to Cristino Parsons, or parent/legal guardian of  Cristino Parsons and verbalized understanding. Patient responses:    Have you ever had a reaction to a flu vaccine? No  Are you able to eat eggs without adverse effects? Yes  Do you have any current illness? No  Have you ever had Guillian Richmond Syndrome? No    Flu vaccine given per order. Please see immunization tab. After obtaining consent, and per orders of Dr. Shirley Livingston, injection of flu given in Right deltoid by Minnie Hackett CMA ([facility]). Patient instructed to remain in clinic for 20 minutes afterwards, and to report any adverse reaction to me immediately.

## 2021-11-20 RX ORDER — MEMANTINE HYDROCHLORIDE 5 MG/1
TABLET ORAL
Qty: 60 TABLET | Refills: 0 | Status: SHIPPED | OUTPATIENT
Start: 2021-11-20 | End: 2021-12-28 | Stop reason: SDUPTHER

## 2021-11-25 LAB — HBA1C MFR BLD: 9.2 % (ref 4.8–5.9)

## 2021-11-26 DIAGNOSIS — E11.65 TYPE 2 DIABETES MELLITUS WITH HYPERGLYCEMIA, WITHOUT LONG-TERM CURRENT USE OF INSULIN (HCC): ICD-10-CM

## 2021-11-26 RX ORDER — PIOGLITAZONEHYDROCHLORIDE 30 MG/1
30 TABLET ORAL DAILY
Qty: 30 TABLET | Refills: 5 | Status: SHIPPED | OUTPATIENT
Start: 2021-11-26 | End: 2022-03-22 | Stop reason: SDUPTHER

## 2021-12-13 ENCOUNTER — HOSPITAL ENCOUNTER (EMERGENCY)
Age: 66
Discharge: LEFT BEFORE DISCHARGE | End: 2021-12-14
Payer: MEDICARE

## 2021-12-13 ENCOUNTER — APPOINTMENT (OUTPATIENT)
Dept: GENERAL RADIOLOGY | Age: 66
End: 2021-12-13
Payer: MEDICARE

## 2021-12-13 ENCOUNTER — APPOINTMENT (OUTPATIENT)
Dept: CT IMAGING | Age: 66
End: 2021-12-13
Payer: MEDICARE

## 2021-12-13 VITALS
OXYGEN SATURATION: 97 % | HEART RATE: 69 BPM | WEIGHT: 210 LBS | TEMPERATURE: 98.1 F | BODY MASS INDEX: 31.1 KG/M2 | RESPIRATION RATE: 18 BRPM | HEIGHT: 69 IN | DIASTOLIC BLOOD PRESSURE: 79 MMHG | SYSTOLIC BLOOD PRESSURE: 183 MMHG

## 2021-12-13 DIAGNOSIS — G45.9 TIA (TRANSIENT ISCHEMIC ATTACK): Primary | ICD-10-CM

## 2021-12-13 DIAGNOSIS — Z53.29 LEFT AGAINST MEDICAL ADVICE: ICD-10-CM

## 2021-12-13 LAB
ALBUMIN SERPL-MCNC: 4.6 G/DL (ref 3.5–4.6)
ALP BLD-CCNC: 69 U/L (ref 35–104)
ALT SERPL-CCNC: 29 U/L (ref 0–41)
ANION GAP SERPL CALCULATED.3IONS-SCNC: 12 MEQ/L (ref 9–15)
APTT: 28.5 SEC (ref 24.4–36.8)
AST SERPL-CCNC: 29 U/L (ref 0–40)
BILIRUB SERPL-MCNC: 0.3 MG/DL (ref 0.2–0.7)
BUN BLDV-MCNC: 9 MG/DL (ref 8–23)
CALCIUM SERPL-MCNC: 9.7 MG/DL (ref 8.5–9.9)
CHLORIDE BLD-SCNC: 104 MEQ/L (ref 95–107)
CHP ED QC CHECK: NORMAL
CO2: 24 MEQ/L (ref 20–31)
CREAT SERPL-MCNC: 1 MG/DL (ref 0.7–1.2)
ETHANOL PERCENT: NORMAL G/DL
ETHANOL: <10 MG/DL (ref 0–0.08)
GFR AFRICAN AMERICAN: >60
GFR NON-AFRICAN AMERICAN: >60
GLOBULIN: 2.6 G/DL (ref 2.3–3.5)
GLUCOSE BLD-MCNC: 145 MG/DL
GLUCOSE BLD-MCNC: 145 MG/DL (ref 60–115)
GLUCOSE BLD-MCNC: 151 MG/DL (ref 70–99)
INR BLD: 1
MAGNESIUM: 1.8 MG/DL (ref 1.7–2.4)
PERFORMED ON: ABNORMAL
POTASSIUM SERPL-SCNC: 3.8 MEQ/L (ref 3.4–4.9)
PROTHROMBIN TIME: 13.1 SEC (ref 12.3–14.9)
SODIUM BLD-SCNC: 140 MEQ/L (ref 135–144)
TOTAL PROTEIN: 7.2 G/DL (ref 6.3–8)
TROPONIN: <0.01 NG/ML (ref 0–0.01)

## 2021-12-13 PROCEDURE — 84484 ASSAY OF TROPONIN QUANT: CPT

## 2021-12-13 PROCEDURE — 85730 THROMBOPLASTIN TIME PARTIAL: CPT

## 2021-12-13 PROCEDURE — 82077 ASSAY SPEC XCP UR&BREATH IA: CPT

## 2021-12-13 PROCEDURE — 36415 COLL VENOUS BLD VENIPUNCTURE: CPT

## 2021-12-13 PROCEDURE — 80053 COMPREHEN METABOLIC PANEL: CPT

## 2021-12-13 PROCEDURE — 71045 X-RAY EXAM CHEST 1 VIEW: CPT

## 2021-12-13 PROCEDURE — 99283 EMERGENCY DEPT VISIT LOW MDM: CPT

## 2021-12-13 PROCEDURE — 85025 COMPLETE CBC W/AUTO DIFF WBC: CPT

## 2021-12-13 PROCEDURE — 85610 PROTHROMBIN TIME: CPT

## 2021-12-13 PROCEDURE — 83735 ASSAY OF MAGNESIUM: CPT

## 2021-12-13 PROCEDURE — 93005 ELECTROCARDIOGRAM TRACING: CPT | Performed by: STUDENT IN AN ORGANIZED HEALTH CARE EDUCATION/TRAINING PROGRAM

## 2021-12-13 PROCEDURE — 70450 CT HEAD/BRAIN W/O DYE: CPT

## 2021-12-13 RX ORDER — METOPROLOL SUCCINATE 25 MG/1
25 TABLET, EXTENDED RELEASE ORAL DAILY
Qty: 10 TABLET | Refills: 0 | Status: SHIPPED | OUTPATIENT
Start: 2021-12-13 | End: 2021-12-13 | Stop reason: CLARIF

## 2021-12-14 LAB
BASOPHILS ABSOLUTE: 0.1 K/UL (ref 0–0.2)
BASOPHILS RELATIVE PERCENT: 1 %
EOSINOPHILS ABSOLUTE: 0.5 K/UL (ref 0–0.7)
EOSINOPHILS RELATIVE PERCENT: 4 %
HCT VFR BLD CALC: 43.1 % (ref 42–52)
HEMOGLOBIN: 13.9 G/DL (ref 14–18)
LYMPHOCYTES ABSOLUTE: 2.4 K/UL (ref 1–4.8)
LYMPHOCYTES RELATIVE PERCENT: 20 %
MCH RBC QN AUTO: 29.1 PG (ref 27–31.3)
MCHC RBC AUTO-ENTMCNC: 32.1 % (ref 33–37)
MCV RBC AUTO: 90.7 FL (ref 80–100)
MONOCYTES ABSOLUTE: 0.6 K/UL (ref 0.2–0.8)
MONOCYTES RELATIVE PERCENT: 4.8 %
NEUTROPHILS ABSOLUTE: 8.3 K/UL (ref 1.4–6.5)
NEUTROPHILS RELATIVE PERCENT: 69 %
PDW BLD-RTO: 13.2 % (ref 11.5–14.5)
PLATELET # BLD: 287 K/UL (ref 130–400)
PLATELET SLIDE REVIEW: ADEQUATE
PROMYELOCYTES PERCENT: 1 %
RBC # BLD: 4.76 M/UL (ref 4.7–6.1)
RBC # BLD: NORMAL 10*6/UL
WBC # BLD: 11.8 K/UL (ref 4.8–10.8)

## 2021-12-14 ASSESSMENT — ENCOUNTER SYMPTOMS
PHOTOPHOBIA: 0
SHORTNESS OF BREATH: 0
WHEEZING: 0
VOMITING: 0
ABDOMINAL PAIN: 0
COUGH: 0
NAUSEA: 0

## 2021-12-14 ASSESSMENT — VISUAL ACUITY: OU: 1

## 2021-12-14 NOTE — ED TRIAGE NOTES
Pt started having increased slurred speech, trouble ambulating at 2000. Pt also began having dizziness. Pt has hx of CVA.  Per wife pt has increased b/p then normal.

## 2021-12-14 NOTE — ED PROVIDER NOTES
[address] [facility] ED  EMERGENCY DEPARTMENT ENCOUNTER      Pt Name: Pamela Lane  MRN: [id number]  [place] 1955  Date of evaluation: 12/13/2021  Provider: Annie Cranker, [address]       Chief Complaint   Patient presents with    Dizziness         HISTORY OF PRESENT ILLNESS   (Location/Symptom, Timing/Onset, Context/Setting, Quality, Duration, Modifying Factors, Severity)  Note limiting factors. Pamela Lane is a 73 y.o. male who per chart review has pmhx of vascular dementia, TIIIDM, ischemic CVA x 2 no deficits, HTN presents to the emergency department for concern for possible stroke. Pt wife states that around 8pm pt complaining of lightheadedness/dizziness. She felt his speech was slurred and he was Derrick on his feet. \" pt states sx have resolved, lasted about 2.5 hours. Wife agrees that slurred speech and balance have improved. He states his current sx do not feel similar to prior strokes. He is on ASA and plavix, compliant. He is asx at this time. Denies headache, dizziness, lightheadedness, cp, sob, fever, chills, cough, visual changes, numbness, tingling, weakness, back or flank pain, nvd, abd pain, urinary sx. Pt wife states he has some memory deficits at baseline 2/2 to dementia. HPI    Nursing Notes were reviewed. REVIEW OF SYSTEMS    (2-9 systems for level 4, 10 or more for level 5)     Review of Systems   Constitutional: Negative for chills, fatigue and fever. HENT: Negative for congestion. Eyes: Negative for photophobia. Respiratory: Negative for cough, shortness of breath and wheezing. Cardiovascular: Negative for chest pain and palpitations. Gastrointestinal: Negative for abdominal pain, nausea and vomiting. Genitourinary: Negative for dysuria, frequency and hematuria. Musculoskeletal: Positive for gait problem (resolved). Negative for myalgias. Allergic/Immunologic: Negative for immunocompromised state.    Neurological: Positive for dizziness Social History     Socioeconomic History    Marital status:      Spouse name: None    Number of children: None    Years of education: None    Highest education level: None   Occupational History    None   Tobacco Use    Smoking status: Former Smoker     Packs/day: 0.50     Years: 37.00     Pack years: 18.50     Types: Cigarettes     Start date: 1976     Quit date: 2013     Years since quittin.0    Smokeless tobacco: Never Used   Substance and Sexual Activity    Alcohol use: Yes     Comment: social - only small amount on Friday, sometimes heavier on Saturday    Drug use: None    Sexual activity: None   Other Topics Concern    None   Social History Narrative    None     Social Determinants of Health     Financial Resource Strain:     Difficulty of Paying Living Expenses: Not on file   Food Insecurity:     Worried About Running Out of Food in the Last Year: Not on file    Alfred of Food in the Last Year: Not on file   Transportation Needs:     Lack of Transportation (Medical): Not on file    Lack of Transportation (Non-Medical):  Not on file   Physical Activity:     Days of Exercise per Week: Not on file    Minutes of Exercise per Session: Not on file   Stress:     Feeling of Stress : Not on file   Social Connections:     Frequency of Communication with Friends and Family: Not on file    Frequency of Social Gatherings with Friends and Family: Not on file    Attends Hinduism Services: Not on file    Active Member of Clubs or Organizations: Not on file    Attends Club or Organization Meetings: Not on file    Marital Status: Not on file   Intimate Partner Violence:     Fear of Current or Ex-Partner: Not on file    Emotionally Abused: Not on file    Physically Abused: Not on file    Sexually Abused: Not on file   Housing Stability:     Unable to Pay for Housing in the Last Year: Not on file    Number of [place] in the Last Year: Not on file    Unstable Housing in the Last Year: Not on file       SCREENINGS                        PHYSICAL EXAM    (up to 7 for level 4, 8 or more for level 5)     ED Triage Vitals [12/13/21 2208]   BP Temp Temp Source Pulse Resp SpO2 Height Weight   (!) 183/79 98.1 °F (36.7 °C) Oral 69 18 97 % 5' 9\" (1.753 m) 210 lb (95.3 kg)       Physical Exam  Constitutional:       General: He is not in acute distress. Appearance: He is well-developed. He is not ill-appearing, toxic-appearing or diaphoretic. HENT:      Head: Normocephalic and atraumatic. Nose: Nose normal.      Mouth/Throat:      Mouth: Mucous membranes are moist.   Eyes:      General: Lids are normal. Vision grossly intact. Gaze aligned appropriately. Extraocular Movements: Extraocular movements intact. Conjunctiva/sclera: Conjunctivae normal.      Pupils: Pupils are equal, round, and reactive to light. Cardiovascular:      Rate and Rhythm: Normal rate and regular rhythm. Heart sounds: No murmur heard. No friction rub. No gallop. Pulmonary:      Effort: Pulmonary effort is normal.      Breath sounds: Normal breath sounds. No wheezing, rhonchi or rales. Abdominal:      General: Bowel sounds are normal. There is no distension. Palpations: Abdomen is soft. Tenderness: There is no abdominal tenderness. There is no guarding or rebound. Musculoskeletal:         General: No swelling. Cervical back: Normal range of motion. Right lower leg: No edema. Left lower leg: No edema. Skin:     General: Skin is warm and dry. Capillary Refill: Capillary refill takes less than 2 seconds. Findings: No rash. Neurological:      General: No focal deficit present. Mental Status: He is alert. Mental status is at baseline. GCS: GCS eye subscore is 4. GCS verbal subscore is 5. GCS motor subscore is 6. Cranial Nerves: Cranial nerves are intact. Sensory: Sensation is intact. Motor: Motor function is intact. Coordination: Coordination is intact. Gait: Gait is intact. Comments: NIHSS 0   Pt states date is 2001, wife states this is baseline 2/2 to vascular dementia          DIAGNOSTIC RESULTS     EKG: All EKG's are interpreted by the Emergency Department Physician who either signs or Co-signs this chart in the absence of a cardiologist.    EKG shows NSR with HR 64, normal axis, normal intervals, no ST changes. RADIOLOGY:   Non-plain film images such as CT, Ultrasound and MRI are read by the radiologist. Plain radiographic images are visualized and preliminarily interpreted by the emergency physician with the below findings:    Interpretation per the Radiologist below, if available at the time of this note:    XR CHEST PORTABLE    (Results Pending)   CT Head WO Contrast    (Results Pending)         ED BEDSIDE ULTRASOUND:   Performed by ED Physician - none    LABS:  Labs Reviewed   COMPREHENSIVE METABOLIC PANEL - Abnormal; Notable for the following components:       Result Value    Glucose 151 (*)     All other components within normal limits   CBC WITH AUTO DIFFERENTIAL - Abnormal; Notable for the following components:    WBC 11.8 (*)     Hemoglobin 13.9 (*)     MCHC 32.1 (*)     All other components within normal limits   POCT GLUCOSE - Abnormal; Notable for the following components:    POC Glucose 145 (*)     All other components within normal limits   POCT GLUCOSE - Normal   ETHANOL   MAGNESIUM   TROPONIN   APTT   PROTIME-INR   URINE DRUG SCREEN   URINE RT REFLEX TO CULTURE       All other labs were within normal range or not returned as of this dictation.     EMERGENCY DEPARTMENT COURSE and DIFFERENTIAL DIAGNOSIS/MDM:   Vitals:    Vitals:    12/13/21 2208   BP: (!) 183/79   Pulse: 69   Resp: 18   Temp: 98.1 °F (36.7 °C)   TempSrc: Oral   SpO2: 97%   Weight: 210 lb (95.3 kg)   Height: 5' 9\" (1.753 m)         MDM    Pt is a 79-year-old male presents to the ED for evaluation of lightheadedness slurred speech and balance difficulty, since resolved. Patient is asymptomatic at this time. He is afebrile and hemodynamically stable. NIHSS of 0. No focal deficit. Labs unremarkable. EKG shows NSR with HR 64, normal axis, normal intervals, no ST changes. Ct head shows chronic small vessel disease, remote chronic lacunar infarct within the L basal ganglia. Pt remains asx in the ED. Suspect TIA, recommended admission. Pt refusing admission, states \"I have had two strokes and I will take my chances. \" wife at bedside feels he is competent to make decisions at this time, states she had a hard time convincing him to even come to the ED for evaluation. explained to the patient that he will need to sign out [address] He was provided the risks of signing out [address] including but not limited to death permanent disability loss of current lifestyle. Patient understands there is a risk of having CVA after TIA. Patient verbally demonstrates understanding of these risks, signed the [place] form himself. He follows with Dr. Humberto Cooper of neurology, encouraged to follow up with him and pcp in 1-2 days. Return to the ED should he change his mind about treatment or for worsening symptoms given warning signs for which she should return. Patient and patient wife understand agree to plan. All questions answered. REASSESSMENT          CRITICAL CARE TIME   Total Critical Care time was 0 minutes, excluding separately reportable procedures. There was a high probability of clinically significant/life threatening deterioration in the patient's condition which required my urgent intervention. CONSULTS:  None    PROCEDURES:  Unless otherwise noted below, none     Procedures        FINAL IMPRESSION      1. TIA (transient ischemic attack)    2.  Left against medical advice          DISPOSITION/PLAN   DISPOSITION [place] 12/14/2021 12:07:21 AM      PATIENT REFERRED TO:  [facility]) ED  [address] [place]  [phone]  Go to   As needed, If symptoms worsen    Maura Ly MD  [address], [facility] [place]  [phone]    Schedule an appointment as soon as possible for a visit in 1 day      Kavitha Foy MD  [address]  [address] [facility]  [phone]    Schedule an appointment as soon as possible for a visit in 1 day        DISCHARGE MEDICATIONS:  Discharge Medication List as of 12/14/2021 12:07 AM        Controlled Substances Monitoring:     No flowsheet data found.     (Please note that portions of this note were completed with a voice recognition program.  Efforts were made to edit the dictations but occasionally words are mis-transcribed.)    Flip Boston PA-C (electronically signed)             Flip Boston PA-C  12/14/21 0157

## 2021-12-15 LAB
EKG ATRIAL RATE: 64 BPM
EKG P AXIS: 46 DEGREES
EKG P-R INTERVAL: 140 MS
EKG Q-T INTERVAL: 438 MS
EKG QRS DURATION: 100 MS
EKG QTC CALCULATION (BAZETT): 451 MS
EKG R AXIS: 67 DEGREES
EKG T AXIS: 37 DEGREES
EKG VENTRICULAR RATE: 64 BPM

## 2021-12-15 PROCEDURE — 93010 ELECTROCARDIOGRAM REPORT: CPT | Performed by: INTERNAL MEDICINE

## 2021-12-22 ENCOUNTER — OFFICE VISIT (OUTPATIENT)
Dept: FAMILY MEDICINE CLINIC | Age: 66
End: 2021-12-22
Payer: MEDICARE

## 2021-12-22 VITALS
SYSTOLIC BLOOD PRESSURE: 120 MMHG | DIASTOLIC BLOOD PRESSURE: 66 MMHG | WEIGHT: 211 LBS | HEART RATE: 70 BPM | OXYGEN SATURATION: 97 % | HEIGHT: 69 IN | BODY MASS INDEX: 31.25 KG/M2 | TEMPERATURE: 96.6 F

## 2021-12-22 DIAGNOSIS — E11.65 TYPE 2 DIABETES MELLITUS WITH HYPERGLYCEMIA, WITHOUT LONG-TERM CURRENT USE OF INSULIN (HCC): ICD-10-CM

## 2021-12-22 DIAGNOSIS — G45.9 TIA (TRANSIENT ISCHEMIC ATTACK): Primary | ICD-10-CM

## 2021-12-22 DIAGNOSIS — I10 ESSENTIAL HYPERTENSION: ICD-10-CM

## 2021-12-22 DIAGNOSIS — F01.50 VASCULAR DEMENTIA WITHOUT BEHAVIORAL DISTURBANCE (HCC): ICD-10-CM

## 2021-12-22 PROCEDURE — G8484 FLU IMMUNIZE NO ADMIN: HCPCS | Performed by: FAMILY MEDICINE

## 2021-12-22 PROCEDURE — 4040F PNEUMOC VAC/ADMIN/RCVD: CPT | Performed by: FAMILY MEDICINE

## 2021-12-22 PROCEDURE — 2022F DILAT RTA XM EVC RTNOPTHY: CPT | Performed by: FAMILY MEDICINE

## 2021-12-22 PROCEDURE — 1036F TOBACCO NON-USER: CPT | Performed by: FAMILY MEDICINE

## 2021-12-22 PROCEDURE — 3017F COLORECTAL CA SCREEN DOC REV: CPT | Performed by: FAMILY MEDICINE

## 2021-12-22 PROCEDURE — 1123F ACP DISCUSS/DSCN MKR DOCD: CPT | Performed by: FAMILY MEDICINE

## 2021-12-22 PROCEDURE — G8427 DOCREV CUR MEDS BY ELIG CLIN: HCPCS | Performed by: FAMILY MEDICINE

## 2021-12-22 PROCEDURE — G8417 CALC BMI ABV UP PARAM F/U: HCPCS | Performed by: FAMILY MEDICINE

## 2021-12-22 PROCEDURE — 99213 OFFICE O/P EST LOW 20 MIN: CPT | Performed by: FAMILY MEDICINE

## 2021-12-22 SDOH — ECONOMIC STABILITY: FOOD INSECURITY: WITHIN THE PAST 12 MONTHS, THE FOOD YOU BOUGHT JUST DIDN'T LAST AND YOU DIDN'T HAVE MONEY TO GET MORE.: NEVER TRUE

## 2021-12-22 SDOH — ECONOMIC STABILITY: FOOD INSECURITY: WITHIN THE PAST 12 MONTHS, YOU WORRIED THAT YOUR FOOD WOULD RUN OUT BEFORE YOU GOT MONEY TO BUY MORE.: NEVER TRUE

## 2021-12-22 ASSESSMENT — SOCIAL DETERMINANTS OF HEALTH (SDOH): HOW HARD IS IT FOR YOU TO PAY FOR THE VERY BASICS LIKE FOOD, HOUSING, MEDICAL CARE, AND HEATING?: NOT HARD AT ALL

## 2021-12-22 NOTE — PROGRESS NOTES
Chief Complaint   Patient presents with    Follow-Up from [facility], dizziness, dx TIA       HPI:  Nathalie Christopher is a 73 y.o. male    Here with wife    Follow up for ER    Likely TIA  Declined to stay for obs  Has been fine since     Recently doubled actos    Hx CVA, HTN, dyslipidemia, diabetes    MRI showing changes of vascular dementia  Trouble with telling time, sometimes with reading comprehension  Concern about perhaps retaking meds    Following with neurology     Has been feeling ok, ultimately     Anger outbursts at times per wife    Doesn't want to take lexapro    Lab Results   Component Value Date    LABA1C 9.2 (H) 11/15/2021     No results found for: EAG      Social History     Socioeconomic History    Marital status:      Spouse name: None    Number of children: None    Years of education: None    Highest education level: None   Occupational History    None   Tobacco Use    Smoking status: Former Smoker     Packs/day: 0.50     Years: 37.00     Pack years: 18.50     Types: Cigarettes     Start date: 1976     Quit date: 2013     Years since quittin.1    Smokeless tobacco: Never Used   Substance and Sexual Activity    Alcohol use: Yes     Comment: social - only small amount on Friday, sometimes heavier on Saturday    Drug use: None    Sexual activity: None   Other Topics Concern    None   Social History Narrative    None     Social Determinants of Health     Financial Resource Strain: Low Risk     Difficulty of Paying Living Expenses: Not hard at all   Food Insecurity: No Food Insecurity    Worried About [facility] in the Last Year: Never true    Alfred of Food in the Last Year: Never true   Transportation Needs: No Transportation Needs    Lack of Transportation (Medical): No    Lack of Transportation (Non-Medical):  No   Physical Activity:     Days of Exercise per Week: Not on file    Minutes of Exercise per Session: Not on file   Stress:     Feeling of Stress : Not on file   Social Connections:     Frequency of Communication with Friends and Family: Not on file    Frequency of Social Gatherings with Friends and Family: Not on file    Attends Confucianism Services: Not on file    Active Member of Clubs or Organizations: Not on file    Attends Club or Organization Meetings: Not on file    Marital Status: Not on file   Intimate Partner Violence:     Fear of Current or Ex-Partner: Not on file    Emotionally Abused: Not on file    Physically Abused: Not on file    Sexually Abused: Not on file   Housing Stability:     Unable to Pay for Housing in the Last Year: Not on file    Number of [place] in the Last Year: Not on file    Unstable Housing in the Last Year: Not on file         MRI from past   IMPRESSION ACUTE [address] Wt Readings from Last 3 Encounters:   12/22/21 211 lb (95.7 kg)   12/13/21 210 lb (95.3 kg)   11/15/21 212 lb (96.2 kg)       Patient Active Problem List   Diagnosis    Allergic rhinitis    Hypertension    [facility] vessel cerebrovascular accident ([facility] [address]    Pilonidal cyst    Type 2 diabetes mellitus    Vascular dementia without behavioral disturbance ([facility] [address] [facility]    Memory loss       Current Outpatient Medications   Medication Sig Dispense Refill    pioglitazone (ACTOS) 30 MG tablet Take 1 tablet by mouth daily 30 tablet 5    memantine (NAMENDA) 5 MG tablet TAKE 1 TABLET BY MOUTH EVERY MORNING FOR 2 WEEKS, THEN TAKE 1 TABLET TWICE DAILY THEREAFTER.  60 tablet 0    donepezil (ARICEPT) 10 MG tablet Take 1 tablet by mouth daily q  am 30 tablet 3    aspirin 81 MG EC tablet Take 81 mg by mouth daily      clopidogrel (PLAVIX) 75 MG tablet Take 1 tablet by mouth daily 30 tablet 11    metFORMIN (GLUCOPHAGE) 1000 MG tablet Take 1 tablet by mouth 2 times daily (with meals) 60 tablet 5    pravastatin (PRAVACHOL) 40 MG tablet Take 1 tablet by mouth daily 30 tablet 11    losartan (COZAAR) 100 MG tablet Take 1 tablet by mouth daily 30 tablet 11     No current facility-administered medications for this visit. Patient's medications, allergies, past medical, surgical, social and family histories were reviewed and updated as appropriate. Review of Systems:   General ROS: negative for - chills, fatigue, fever, malaise, weight gain or weight loss  Respiratory ROS: no cough, shortness of breath, or wheezing  Cardiovascular ROS: no chest pain or dyspnea on exertion  Gastrointestinal ROS: no abdominal pain, change in bowel habits, or black or bloody stools  Genito-Urinary ROS: no dysuria, trouble voiding  Musculoskeletal ROS: negative for - gait disturbance, joint pain or joint stiffness  Neurological ROS: normal speech today     In general patient otherwise reports feeling well. Physical Exam:  /66 (Site: Left Upper Arm)   Pulse 70   Temp 96.6 °F (35.9 °C)   Ht 5' 9\" (1.753 m)   Wt 211 lb (95.7 kg)   SpO2 97%   BMI 31.16 kg/m²     Gen: Well, NAD, Alert, Oriented x 3   HEENT: EOMI, eyes clear, MMM  Skin: without rash or jaundice  Neck: no significant lymphadenopathy or thyromegaly  Lungs: CTA B w/out Rales/Wheezes/Rhonchi, Good respiratory effort   Heart: RRR, S1S2, w/out M/R/G, non-displaced PMI   Ext: No C/C/E Bilaterally. Neuro: nonfocal exam today           Lab Results   Component Value Date    WBC 11.8 (H) 12/13/2021    HGB 13.9 (L) 12/13/2021    HCT 43.1 12/13/2021     12/13/2021    CHOL 203 (H) 12/13/2020    TRIG 107 12/13/2020    HDL 48 12/13/2020    ALT 29 12/13/2021    AST 29 12/13/2021     12/13/2021    K 3.8 12/13/2021     12/13/2021    CREATININE 1.00 12/13/2021    BUN 9 12/13/2021    CO2 24 12/13/2021    TSH 2.650 12/13/2020    PSA 1.52 12/13/2020    INR 1.0 12/13/2021    LABA1C 9.2 (H) 11/15/2021    LABMICR 4.10 (H) 11/15/2021         A&P   Diagnosis Orders   1.  TIA (transient ischemic attack)     2. Type 2 diabetes mellitus with hyperglycemia, without long-term current use of insulin (HCC)     3. Essential hypertension     4.  Vascular dementia without behavioral disturbance (HCC)          Not checking sugars    F/u with neurology     aricept  10      angel Kelly MD

## 2021-12-28 RX ORDER — MEMANTINE HYDROCHLORIDE 5 MG/1
TABLET ORAL
Qty: 60 TABLET | Refills: 2 | Status: SHIPPED | OUTPATIENT
Start: 2021-12-28 | End: 2022-03-22 | Stop reason: SDUPTHER

## 2022-01-05 ENCOUNTER — TELEPHONE (OUTPATIENT)
Dept: FAMILY MEDICINE CLINIC | Age: 67
End: 2022-01-05

## 2022-01-05 NOTE — TELEPHONE ENCOUNTER
I would continue. Lower appetite will eventually improve.   For now it would result in better glucose control and weight loss

## 2022-01-10 DIAGNOSIS — I10 ESSENTIAL HYPERTENSION: ICD-10-CM

## 2022-01-10 LAB
CHOLESTEROL, TOTAL: 135 MG/DL (ref 0–199)
HDLC SERPL-MCNC: 33 MG/DL (ref 40–59)
LDL CHOLESTEROL CALCULATED: 86 MG/DL (ref 0–129)
TRIGL SERPL-MCNC: 81 MG/DL (ref 0–150)

## 2022-03-14 DIAGNOSIS — I10 ESSENTIAL HYPERTENSION: ICD-10-CM

## 2022-03-14 DIAGNOSIS — E11.65 TYPE 2 DIABETES MELLITUS WITH HYPERGLYCEMIA, WITHOUT LONG-TERM CURRENT USE OF INSULIN (HCC): ICD-10-CM

## 2022-03-14 DIAGNOSIS — I63.00 CEREBROVASCULAR ACCIDENT (CVA) DUE TO THROMBOSIS OF PRECEREBRAL ARTERY (HCC): ICD-10-CM

## 2022-03-14 DIAGNOSIS — I63.9 CEREBROVASCULAR ACCIDENT (CVA), UNSPECIFIED MECHANISM (HCC): ICD-10-CM

## 2022-03-14 RX ORDER — LOSARTAN POTASSIUM 100 MG/1
100 TABLET ORAL DAILY
Qty: 30 TABLET | Refills: 0 | Status: SHIPPED | OUTPATIENT
Start: 2022-03-14 | End: 2022-03-22 | Stop reason: SDUPTHER

## 2022-03-14 RX ORDER — CLOPIDOGREL BISULFATE 75 MG/1
75 TABLET ORAL DAILY
Qty: 30 TABLET | Refills: 0 | Status: SHIPPED | OUTPATIENT
Start: 2022-03-14 | End: 2022-03-22 | Stop reason: SDUPTHER

## 2022-03-14 RX ORDER — PRAVASTATIN SODIUM 40 MG
40 TABLET ORAL DAILY
Qty: 30 TABLET | Refills: 0 | Status: SHIPPED | OUTPATIENT
Start: 2022-03-14 | End: 2022-03-22 | Stop reason: SDUPTHER

## 2022-03-21 ENCOUNTER — CARE COORDINATION (OUTPATIENT)
Dept: CARE COORDINATION | Age: 67
End: 2022-03-21

## 2022-03-22 ENCOUNTER — OFFICE VISIT (OUTPATIENT)
Dept: FAMILY MEDICINE CLINIC | Age: 67
End: 2022-03-22
Payer: MEDICARE

## 2022-03-22 VITALS
DIASTOLIC BLOOD PRESSURE: 88 MMHG | HEIGHT: 69 IN | BODY MASS INDEX: 30.81 KG/M2 | SYSTOLIC BLOOD PRESSURE: 138 MMHG | OXYGEN SATURATION: 98 % | WEIGHT: 208 LBS | HEART RATE: 56 BPM | TEMPERATURE: 96.7 F

## 2022-03-22 DIAGNOSIS — E11.65 TYPE 2 DIABETES MELLITUS WITH HYPERGLYCEMIA, WITHOUT LONG-TERM CURRENT USE OF INSULIN (HCC): Primary | ICD-10-CM

## 2022-03-22 DIAGNOSIS — I63.9 CEREBROVASCULAR ACCIDENT (CVA), UNSPECIFIED MECHANISM (HCC): ICD-10-CM

## 2022-03-22 DIAGNOSIS — Z11.4 SCREENING FOR HIV (HUMAN IMMUNODEFICIENCY VIRUS): ICD-10-CM

## 2022-03-22 DIAGNOSIS — F01.50 VASCULAR DEMENTIA WITHOUT BEHAVIORAL DISTURBANCE (HCC): ICD-10-CM

## 2022-03-22 DIAGNOSIS — I10 ESSENTIAL HYPERTENSION: ICD-10-CM

## 2022-03-22 DIAGNOSIS — E11.65 TYPE 2 DIABETES MELLITUS WITH HYPERGLYCEMIA, WITHOUT LONG-TERM CURRENT USE OF INSULIN (HCC): ICD-10-CM

## 2022-03-22 DIAGNOSIS — I63.00 CEREBROVASCULAR ACCIDENT (CVA) DUE TO THROMBOSIS OF PRECEREBRAL ARTERY (HCC): ICD-10-CM

## 2022-03-22 LAB — HBA1C MFR BLD: 6.1 % (ref 4.8–5.9)

## 2022-03-22 PROCEDURE — 3046F HEMOGLOBIN A1C LEVEL >9.0%: CPT | Performed by: FAMILY MEDICINE

## 2022-03-22 PROCEDURE — 1123F ACP DISCUSS/DSCN MKR DOCD: CPT | Performed by: FAMILY MEDICINE

## 2022-03-22 PROCEDURE — 2022F DILAT RTA XM EVC RTNOPTHY: CPT | Performed by: FAMILY MEDICINE

## 2022-03-22 PROCEDURE — 99213 OFFICE O/P EST LOW 20 MIN: CPT | Performed by: FAMILY MEDICINE

## 2022-03-22 PROCEDURE — 1036F TOBACCO NON-USER: CPT | Performed by: FAMILY MEDICINE

## 2022-03-22 PROCEDURE — G8427 DOCREV CUR MEDS BY ELIG CLIN: HCPCS | Performed by: FAMILY MEDICINE

## 2022-03-22 PROCEDURE — G8417 CALC BMI ABV UP PARAM F/U: HCPCS | Performed by: FAMILY MEDICINE

## 2022-03-22 PROCEDURE — 3017F COLORECTAL CA SCREEN DOC REV: CPT | Performed by: FAMILY MEDICINE

## 2022-03-22 PROCEDURE — G8484 FLU IMMUNIZE NO ADMIN: HCPCS | Performed by: FAMILY MEDICINE

## 2022-03-22 PROCEDURE — 4040F PNEUMOC VAC/ADMIN/RCVD: CPT | Performed by: FAMILY MEDICINE

## 2022-03-22 RX ORDER — MEMANTINE HYDROCHLORIDE 5 MG/1
TABLET ORAL
Qty: 60 TABLET | Refills: 5 | Status: SHIPPED | OUTPATIENT
Start: 2022-03-22 | End: 2022-09-08 | Stop reason: SDUPTHER

## 2022-03-22 RX ORDER — DONEPEZIL HYDROCHLORIDE 10 MG/1
10 TABLET, FILM COATED ORAL DAILY
Qty: 30 TABLET | Refills: 5 | Status: SHIPPED | OUTPATIENT
Start: 2022-03-22 | End: 2022-09-08 | Stop reason: SDUPTHER

## 2022-03-22 RX ORDER — CLOPIDOGREL BISULFATE 75 MG/1
75 TABLET ORAL DAILY
Qty: 30 TABLET | Refills: 5 | Status: SHIPPED | OUTPATIENT
Start: 2022-03-22 | End: 2022-09-08 | Stop reason: SDUPTHER

## 2022-03-22 RX ORDER — LOSARTAN POTASSIUM 100 MG/1
100 TABLET ORAL DAILY
Qty: 30 TABLET | Refills: 5 | Status: SHIPPED | OUTPATIENT
Start: 2022-03-22 | End: 2022-09-08 | Stop reason: SDUPTHER

## 2022-03-22 RX ORDER — PIOGLITAZONEHYDROCHLORIDE 30 MG/1
30 TABLET ORAL DAILY
Qty: 30 TABLET | Refills: 5 | Status: SHIPPED | OUTPATIENT
Start: 2022-03-22 | End: 2022-09-08 | Stop reason: SDUPTHER

## 2022-03-22 RX ORDER — PRAVASTATIN SODIUM 40 MG
40 TABLET ORAL DAILY
Qty: 30 TABLET | Refills: 5 | Status: SHIPPED | OUTPATIENT
Start: 2022-03-22 | End: 2022-09-08 | Stop reason: SDUPTHER

## 2022-03-22 ASSESSMENT — PATIENT HEALTH QUESTIONNAIRE - PHQ9
2. FEELING DOWN, DEPRESSED OR HOPELESS: 0
1. LITTLE INTEREST OR PLEASURE IN DOING THINGS: 0
SUM OF ALL RESPONSES TO PHQ QUESTIONS 1-9: 0
SUM OF ALL RESPONSES TO PHQ QUESTIONS 1-9: 0
SUM OF ALL RESPONSES TO PHQ9 QUESTIONS 1 & 2: 0
SUM OF ALL RESPONSES TO PHQ QUESTIONS 1-9: 0
SUM OF ALL RESPONSES TO PHQ QUESTIONS 1-9: 0

## 2022-03-22 NOTE — PROGRESS NOTES
Chief Complaint   Patient presents with    Diabetes     3 month     Hypertension       HPI:  Scotty Caldera is a 73 y.o. male    Here with wife    Hx CVA, HTN, dyslipidemia, diabetes    Following with neurology     Has been feeling ok, ultimately     Has a taste for mints     Lab Results   Component Value Date    LABA1C 9.2 (H) 11/15/2021     No results found for: EAG      Social History     Socioeconomic History    Marital status:      Spouse name: None    Number of children: None    Years of education: None    Highest education level: None   Occupational History    None   Tobacco Use    Smoking status: Former Smoker     Packs/day: 0.50     Years: 37.00     Pack years: 18.50     Types: Cigarettes     Start date: 1976     Quit date: 2013     Years since quittin.3    Smokeless tobacco: Never Used   Substance and Sexual Activity    Alcohol use: Yes     Comment: social - only small amount on Friday, sometimes heavier on Saturday    Drug use: None    Sexual activity: None   Other Topics Concern    None   Social History Narrative    None     Social Determinants of Health     Financial Resource Strain: Low Risk     Difficulty of Paying Living Expenses: Not hard at all   Food Insecurity: No Food Insecurity    Worried About [facility] in the Last Year: Never true    [facility] in the Last Year: Never true   Transportation Needs: No Transportation Needs    Lack of Transportation (Medical): No    Lack of Transportation (Non-Medical):  No   Physical Activity:     Days of Exercise per Week: Not on file    Minutes of Exercise per Session: Not on file   Stress:     Feeling of Stress : Not on file   Social Connections:     Frequency of Communication with Friends and Family: Not on file    Frequency of Social Gatherings with Friends and Family: Not on file    Attends Judaism Services: Not on file    Active Member of Clubs or Organizations: Not on file    Attends Club or Organization Meetings: Not on file    Marital Status: Not on file   Intimate Partner Violence:     Fear of Current or Ex-Partner: Not on file    Emotionally Abused: Not on file    Physically Abused: Not on file    Sexually Abused: Not on file   Housing Stability:     Unable to Pay for Housing in the Last Year: Not on file    Number of [place] in the Last Year: Not on file    Unstable Housing in the Last Year: Not on file         MRI from past   IMPRESSION ACUTE [address]. Wt Readings from Last 3 Encounters:   03/22/22 208 lb (94.3 kg)   12/22/21 211 lb (95.7 kg)   12/13/21 210 lb (95.3 kg)       Patient Active Problem List   Diagnosis    Allergic rhinitis    Hypertension    Small vessel cerebrovascular accident (CVA) ([facility] [address]    Pilonidal cyst    Type 2 diabetes mellitus with hyperglycemia, without long-term current use of insulin (HCC)    Vascular dementia without behavioral disturbance (HCC)    Memory loss       Current Outpatient Medications   Medication Sig Dispense Refill    pravastatin (PRAVACHOL) 40 MG tablet Take 1 tablet by mouth daily 30 tablet 5    pioglitazone (ACTOS) 30 MG tablet Take 1 tablet by mouth daily 30 tablet 5    metFORMIN (GLUCOPHAGE) 1000 MG tablet TAKE 1 TABLET BY MOUTH TWICE DAILY WITH MEALS 60 tablet 5    memantine (NAMENDA) 5 MG tablet TAKE 1 TABLET TWICE DAILY 60 tablet 5    losartan (COZAAR) 100 MG tablet Take 1 tablet by mouth daily 30 tablet 5    donepezil (ARICEPT) 10 MG tablet Take 1 tablet by mouth daily q  am 30 tablet 5    clopidogrel (PLAVIX) 75 MG tablet Take 1 tablet by mouth daily 30 tablet 5    aspirin 81 MG EC tablet Take 81 mg by mouth daily       No current facility-administered medications for this visit.        Patient's medications, allergies, past medical, surgical, social and family histories were reviewed and updated as appropriate. Review of Systems:   General ROS: negative for - chills, fatigue, fever, malaise, weight gain or weight loss  Respiratory ROS: no cough, shortness of breath, or wheezing  Cardiovascular ROS: no chest pain or dyspnea on exertion  Gastrointestinal ROS: no abdominal pain, change in bowel habits, or black or bloody stools  Genito-Urinary ROS: no dysuria, trouble voiding  Musculoskeletal ROS: negative for - gait disturbance, joint pain or joint stiffness  Neurological ROS: normal speech today     In general patient otherwise reports feeling well. Physical Exam:  /88 (Site: Right Upper Arm)   Pulse 56   Temp 96.7 °F (35.9 °C)   Ht 5' 9\" (1.753 m)   Wt 208 lb (94.3 kg)   SpO2 98%   BMI 30.72 kg/m²     Gen: Well, NAD, Alert, Oriented x 3   HEENT: EOMI, eyes clear, MMM  Skin: SKs on back   Neck: no significant lymphadenopathy or thyromegaly  Lungs: CTA B w/out Rales/Wheezes/Rhonchi, Good respiratory effort   Heart: RRR, S1S2, w/out M/R/G, non-displaced PMI   Ext: No C/C/E Bilaterally. Neuro: nonfocal exam today   Psych: euthymic         Lab Results   Component Value Date    WBC 11.8 (H) 12/13/2021    HGB 13.9 (L) 12/13/2021    HCT 43.1 12/13/2021     12/13/2021    CHOL 135 01/10/2022    TRIG 81 01/10/2022    HDL 33 (L) 01/10/2022    ALT 29 12/13/2021    AST 29 12/13/2021     12/13/2021    K 3.8 12/13/2021     12/13/2021    CREATININE 1.00 12/13/2021    BUN 9 12/13/2021    CO2 24 12/13/2021    TSH 2.650 12/13/2020    PSA 1.52 12/13/2020    INR 1.0 12/13/2021    LABA1C 9.2 (H) 11/15/2021    LABMICR 4.10 (H) 11/15/2021         A&P   Diagnosis Orders   1. Type 2 diabetes mellitus with hyperglycemia, without long-term current use of insulin (HCC)  pioglitazone (ACTOS) 30 MG tablet    metFORMIN (GLUCOPHAGE) 1000 MG tablet    Hemoglobin A1C   2. [facility] disturbance [facility]     3.  Essential hypertension  pravastatin (PRAVACHOL) 40 MG tablet    losartan (COZAAR) 100 MG tablet   4. Cerebrovascular accident (CVA) due to thrombosis of precerebral artery (HCC)  pravastatin (PRAVACHOL) 40 MG tablet   5. Cerebrovascular accident (CVA), unspecified mechanism ([address]  clopidogrel (PLAVIX) 75 MG tablet   6.  Screening for HIV (human immunodeficiency virus)  HIV Screen        Overall is quite stable   Sugars improved  bp controlled     Wt Readings from Last 3 Encounters:   03/22/22 208 lb (94.3 kg)   12/22/21 211 lb (95.7 kg)   12/13/21 210 lb (95.3 kg)             Tico Peterson MD

## 2022-03-23 LAB — HIV AG/AB: NONREACTIVE

## 2022-03-28 ENCOUNTER — CARE COORDINATION (OUTPATIENT)
Dept: CARE COORDINATION | Age: 67
End: 2022-03-28

## 2022-04-04 ENCOUNTER — CARE COORDINATION (OUTPATIENT)
Dept: CARE COORDINATION | Age: 67
End: 2022-04-04

## 2022-04-04 NOTE — CARE COORDINATION
Third and final attempt to contact patient for care coordination discussion regarding diabetes. Unable to reach patient by phone. Message left regarding purpose of call. Number provided and call back requested. Episode completed/resolved.

## 2022-08-18 ENCOUNTER — OFFICE VISIT (OUTPATIENT)
Dept: FAMILY MEDICINE CLINIC | Age: 67
End: 2022-08-18
Payer: MEDICARE

## 2022-08-18 VITALS
SYSTOLIC BLOOD PRESSURE: 128 MMHG | HEART RATE: 68 BPM | OXYGEN SATURATION: 96 % | HEIGHT: 69 IN | BODY MASS INDEX: 31.19 KG/M2 | DIASTOLIC BLOOD PRESSURE: 82 MMHG | WEIGHT: 210.6 LBS | TEMPERATURE: 97.8 F

## 2022-08-18 DIAGNOSIS — M54.6 ACUTE MIDLINE THORACIC BACK PAIN: Primary | ICD-10-CM

## 2022-08-18 DIAGNOSIS — M54.50 ACUTE MIDLINE LOW BACK PAIN WITHOUT SCIATICA: ICD-10-CM

## 2022-08-18 PROCEDURE — 99213 OFFICE O/P EST LOW 20 MIN: CPT | Performed by: NURSE PRACTITIONER

## 2022-08-18 PROCEDURE — G8417 CALC BMI ABV UP PARAM F/U: HCPCS | Performed by: NURSE PRACTITIONER

## 2022-08-18 PROCEDURE — 1123F ACP DISCUSS/DSCN MKR DOCD: CPT | Performed by: NURSE PRACTITIONER

## 2022-08-18 PROCEDURE — 3017F COLORECTAL CA SCREEN DOC REV: CPT | Performed by: NURSE PRACTITIONER

## 2022-08-18 PROCEDURE — 1036F TOBACCO NON-USER: CPT | Performed by: NURSE PRACTITIONER

## 2022-08-18 PROCEDURE — G8427 DOCREV CUR MEDS BY ELIG CLIN: HCPCS | Performed by: NURSE PRACTITIONER

## 2022-08-18 RX ORDER — CHLORHEXIDINE GLUCONATE 0.12 MG/ML
RINSE ORAL
COMMUNITY
Start: 2022-05-14

## 2022-08-18 ASSESSMENT — ENCOUNTER SYMPTOMS
NAUSEA: 0
TROUBLE SWALLOWING: 0
CONSTIPATION: 0
ABDOMINAL DISTENTION: 0
COLOR CHANGE: 0
WHEEZING: 0
SINUS PRESSURE: 0
COUGH: 0
DIARRHEA: 0
RHINORRHEA: 0
BACK PAIN: 1
SINUS PAIN: 0
VOMITING: 0
BOWEL INCONTINENCE: 0
SHORTNESS OF BREATH: 0
ABDOMINAL PAIN: 0
SORE THROAT: 0
CHEST TIGHTNESS: 0

## 2022-08-18 NOTE — PROGRESS NOTES
[facility] Encounter      [facility]       Chief Complaint   Patient presents with    Back Pain     Middle/center of back has been a while. Dale Apley a couple weeks ago. Wife states that he had fell fay prior as well. Nurses Notes reviewed and I agree except as noted in the HPI. HISTORY OF PRESENT ILLNESS   Ariane Hoover is a 78 y.o. male who presents   Back Pain  This is a new problem. The current episode started 1 to 4 weeks ago (2 weeks ago, lost balance and fell onto back from standing height, had been drinking heavily and fell accidentally, fell the next day while bending forward to fix a pump, wife states he was also drinking heavily that day). The problem occurs daily. The problem is unchanged. The pain is present in the thoracic spine. The quality of the pain is described as aching. The pain does not radiate. The pain is at a severity of 2/10. The pain is mild. The pain is The same all the time. The symptoms are aggravated by bending and twisting. Pertinent negatives include no abdominal pain, bladder incontinence, bowel incontinence, chest pain, dysuria, fever, headaches, leg pain, numbness, paresis, paresthesias, pelvic pain, perianal numbness, tingling, weakness or weight loss. REVIEW OF SYSTEMS     Review of Systems   Constitutional:  Negative for activity change, appetite change, chills, diaphoresis, fatigue, fever and weight loss. HENT:  Negative for congestion, ear pain, postnasal drip, rhinorrhea, sinus pressure, sinus pain, sore throat and trouble swallowing. Eyes:  Negative for visual disturbance. Respiratory:  Negative for cough, chest tightness, shortness of breath and wheezing. Cardiovascular:  Negative for chest pain and palpitations. Gastrointestinal:  Negative for abdominal distention, abdominal pain, bowel incontinence, constipation, diarrhea, nausea and vomiting.    Genitourinary:  Negative for bladder incontinence, difficulty urinating, dysuria, flank pain, frequency, pelvic pain and urgency. Musculoskeletal:  Positive for back pain and myalgias. Negative for arthralgias, neck pain and neck stiffness. Skin:  Negative for color change and rash. Neurological:  Negative for dizziness, tingling, tremors, seizures, syncope, speech difficulty, weakness, light-headedness, numbness, headaches and paresthesias. PAST MEDICAL HISTORY         Diagnosis Date    Allergic rhinitis     hayfever    Hypertension     Pilonidal cyst     Stroke [facility])     Tobacco use disorder        SURGICAL HISTORY     Patient  has no past surgical history on file. CURRENT MEDICATIONS       Previous Medications    ASPIRIN 81 MG EC TABLET    Take 81 mg by mouth daily    CHLORHEXIDINE (PERIDEX) 0.12 % SOLUTION    SWISH & SPIT 1 TABLESPOONFUL (15 ML) BY MOUTH FOR 30 SECONDS TWICE DAILY (AFTER BRUSHING)    CLOPIDOGREL (PLAVIX) 75 MG TABLET    Take 1 tablet by mouth daily    DONEPEZIL (ARICEPT) 10 MG TABLET    Take 1 tablet by mouth daily q  am    LOSARTAN (COZAAR) 100 MG TABLET    Take 1 tablet by mouth daily    MEMANTINE (NAMENDA) 5 MG TABLET    TAKE 1 TABLET TWICE DAILY    METFORMIN (GLUCOPHAGE) 1000 MG TABLET    TAKE 1 TABLET BY MOUTH TWICE DAILY WITH MEALS    PIOGLITAZONE (ACTOS) 30 MG TABLET    Take 1 tablet by mouth daily    PRAVASTATIN (PRAVACHOL) 40 MG TABLET    Take 1 tablet by mouth daily       ALLERGIES     Patientis is allergic to sulfa antibiotics. FAMILY HISTORY     Patient's family history includes High Blood Pressure in his father and mother; Liver Cancer in his maternal uncle; Stroke in his maternal grandfather, maternal grandmother, and mother. SOCIAL HISTORY     Patient  reports that he quit smoking about 8 years ago. His smoking use included cigarettes. He started smoking about 46 years ago. He has a 18.50 pack-year smoking history. He has never used smokeless tobacco. He reports current alcohol use.     PHYSICAL EXAM     ED TRIAGE VITALS  BP: 128/82, Temp: 97.8 °F (36.6 °C), Heart Rate: 68,  , SpO2: 96 %  Physical Exam  Constitutional:       General: He is not in acute distress. Appearance: Normal appearance. He is normal weight. He is not ill-appearing, toxic-appearing or diaphoretic. HENT:      Head: Normocephalic and atraumatic. Right Ear: External ear normal.      Left Ear: External ear normal.      Nose: Nose normal. No congestion or rhinorrhea. Eyes:      General:         Right eye: No discharge. Left eye: No discharge. Pupils: Pupils are equal, round, and reactive to light. Cardiovascular:      Rate and Rhythm: Normal rate and regular rhythm. Pulses: Normal pulses. Pulmonary:      Effort: Pulmonary effort is normal.      Breath sounds: Normal breath sounds. Chest:      Chest wall: No tenderness. Abdominal:      General: There is no distension. Palpations: Abdomen is soft. Tenderness: There is no abdominal tenderness. Musculoskeletal:         General: No signs of injury. Normal range of motion. Cervical back: Normal, normal range of motion and neck supple. No rigidity or tenderness. Thoracic back: Spasms and tenderness present. No swelling, deformity or bony tenderness. Lumbar back: Spasms and tenderness present. No swelling, deformity, lacerations or bony tenderness. Back:    Skin:     General: Skin is warm and dry. Capillary Refill: Capillary refill takes less than 2 seconds. Neurological:      General: No focal deficit present. Mental Status: He is alert and oriented to person, place, and time. Mental status is at baseline. Cranial Nerves: No cranial nerve deficit. Sensory: No sensory deficit. Motor: No weakness.       Coordination: Coordination normal.       DIAGNOSTICRESULTS   Labs:       IMAGING:    URGENT CARE COURSE:     Vitals:    08/18/22 1130   BP: 128/82   Site: Right Upper Arm   Position: Sitting   Cuff Size: Large Adult Pulse: 68   Temp: 97.8 °F (36.6 °C)   TempSrc: Tympanic   SpO2: 96%   Weight: 210 lb 9.6 oz (95.5 kg)   Height: 5' 9\" (1.753 m)         PROCEDURES:  None  FINAL IMPRESSION      1. Acute midline thoracic back pain    2. Acute midline low back pain without sciatica        DISPOSITION/PLAN   DISPOSITION      PATIENT REFERRED TO:  Return in about 1 week (around 8/25/2022), or if symptoms worsen or fail to improve, for follow up with PCP. DISCHARGE MEDICATIONS:  New Prescriptions    DICLOFENAC SODIUM (VOLTAREN) 1 % GEL    Apply 2 g topically 4 times daily as needed for Pain     Cannot display discharge medications since this is not an admission.       Bernarda Abernathy, APRN - CNP

## 2022-09-04 ENCOUNTER — HOSPITAL ENCOUNTER (EMERGENCY)
Age: 67
Discharge: HOME | End: 2022-09-04
Attending: EMERGENCY MEDICINE
Payer: MEDICARE

## 2022-09-04 ENCOUNTER — APPOINTMENT (OUTPATIENT)
Dept: CT IMAGING | Age: 67
End: 2022-09-04
Payer: MEDICARE

## 2022-09-04 VITALS
TEMPERATURE: 98.1 F | SYSTOLIC BLOOD PRESSURE: 146 MMHG | RESPIRATION RATE: 18 BRPM | OXYGEN SATURATION: 95 % | WEIGHT: 200 LBS | HEART RATE: 67 BPM | DIASTOLIC BLOOD PRESSURE: 77 MMHG | BODY MASS INDEX: 29.53 KG/M2

## 2022-09-04 DIAGNOSIS — R42 DIZZINESS: Primary | ICD-10-CM

## 2022-09-04 LAB
ALBUMIN SERPL-MCNC: 3.9 G/DL (ref 3.5–4.6)
ALP BLD-CCNC: 60 U/L (ref 35–104)
ALT SERPL-CCNC: 16 U/L (ref 0–41)
ANION GAP SERPL CALCULATED.3IONS-SCNC: 9 MEQ/L (ref 9–15)
AST SERPL-CCNC: 20 U/L (ref 0–40)
BASOPHILS ABSOLUTE: 0 K/UL (ref 0–0.2)
BASOPHILS RELATIVE PERCENT: 0.4 %
BILIRUB SERPL-MCNC: 0.4 MG/DL (ref 0.2–0.7)
BUN BLDV-MCNC: 9 MG/DL (ref 8–23)
CALCIUM SERPL-MCNC: 9.3 MG/DL (ref 8.5–9.9)
CHLORIDE BLD-SCNC: 104 MEQ/L (ref 95–107)
CO2: 25 MEQ/L (ref 20–31)
CREAT SERPL-MCNC: 1.03 MG/DL (ref 0.7–1.2)
EOSINOPHILS ABSOLUTE: 0.5 K/UL (ref 0–0.7)
EOSINOPHILS RELATIVE PERCENT: 7.5 %
GFR AFRICAN AMERICAN: >60
GFR NON-AFRICAN AMERICAN: >60
GLOBULIN: 2.2 G/DL (ref 2.3–3.5)
GLUCOSE BLD-MCNC: 175 MG/DL (ref 70–99)
HCT VFR BLD CALC: 40.5 % (ref 42–52)
HEMOGLOBIN: 13.7 G/DL (ref 14–18)
LYMPHOCYTES ABSOLUTE: 2 K/UL (ref 1–4.8)
LYMPHOCYTES RELATIVE PERCENT: 28.3 %
MAGNESIUM: 1.9 MG/DL (ref 1.7–2.4)
MCH RBC QN AUTO: 31.4 PG (ref 27–31.3)
MCHC RBC AUTO-ENTMCNC: 33.8 % (ref 33–37)
MCV RBC AUTO: 92.9 FL (ref 80–100)
MONOCYTES ABSOLUTE: 0.7 K/UL (ref 0.2–0.8)
MONOCYTES RELATIVE PERCENT: 9.9 %
NEUTROPHILS ABSOLUTE: 3.7 K/UL (ref 1.4–6.5)
NEUTROPHILS RELATIVE PERCENT: 53.9 %
PDW BLD-RTO: 13.9 % (ref 11.5–14.5)
PLATELET # BLD: 268 K/UL (ref 130–400)
POTASSIUM SERPL-SCNC: 4 MEQ/L (ref 3.4–4.9)
RBC # BLD: 4.36 M/UL (ref 4.7–6.1)
SODIUM BLD-SCNC: 138 MEQ/L (ref 135–144)
TOTAL PROTEIN: 6.1 G/DL (ref 6.3–8)
TROPONIN: <0.01 NG/ML (ref 0–0.01)
WBC # BLD: 6.9 K/UL (ref 4.8–10.8)

## 2022-09-04 PROCEDURE — 99284 EMERGENCY DEPT VISIT MOD MDM: CPT

## 2022-09-04 PROCEDURE — 93005 ELECTROCARDIOGRAM TRACING: CPT

## 2022-09-04 PROCEDURE — 84484 ASSAY OF TROPONIN QUANT: CPT

## 2022-09-04 PROCEDURE — 85025 COMPLETE CBC W/AUTO DIFF WBC: CPT

## 2022-09-04 PROCEDURE — 36415 COLL VENOUS BLD VENIPUNCTURE: CPT

## 2022-09-04 PROCEDURE — 83735 ASSAY OF MAGNESIUM: CPT

## 2022-09-04 PROCEDURE — 80053 COMPREHEN METABOLIC PANEL: CPT

## 2022-09-04 PROCEDURE — 70450 CT HEAD/BRAIN W/O DYE: CPT

## 2022-09-04 PROCEDURE — 6370000000 HC RX 637 (ALT 250 FOR IP): Performed by: EMERGENCY MEDICINE

## 2022-09-04 RX ORDER — MECLIZINE HYDROCHLORIDE 25 MG/1
25 TABLET ORAL ONCE
Status: COMPLETED | OUTPATIENT
Start: 2022-09-04 | End: 2022-09-04

## 2022-09-04 RX ORDER — DIAZEPAM 5 MG/1
5 TABLET ORAL DAILY PRN
Qty: 5 TABLET | Refills: 0 | Status: SHIPPED | OUTPATIENT
Start: 2022-09-04 | End: 2022-09-08 | Stop reason: SDUPTHER

## 2022-09-04 RX ORDER — DIAZEPAM 5 MG/1
5 TABLET ORAL ONCE
Status: COMPLETED | OUTPATIENT
Start: 2022-09-04 | End: 2022-09-04

## 2022-09-04 RX ORDER — MECLIZINE HYDROCHLORIDE 25 MG/1
25 TABLET ORAL 2 TIMES DAILY
Qty: 20 TABLET | Refills: 0 | Status: SHIPPED | OUTPATIENT
Start: 2022-09-04 | End: 2022-09-08 | Stop reason: SDUPTHER

## 2022-09-04 RX ORDER — 0.9 % SODIUM CHLORIDE 0.9 %
1000 INTRAVENOUS SOLUTION INTRAVENOUS ONCE
Status: DISCONTINUED | OUTPATIENT
Start: 2022-09-04 | End: 2022-09-04

## 2022-09-04 RX ADMIN — MECLIZINE HYDROCHLORIDE 25 MG: 25 TABLET ORAL at 12:45

## 2022-09-04 RX ADMIN — DIAZEPAM 5 MG: 5 TABLET ORAL at 12:45

## 2022-09-04 ASSESSMENT — ENCOUNTER SYMPTOMS
ABDOMINAL PAIN: 0
SORE THROAT: 0
VOMITING: 0
DIARRHEA: 0
COUGH: 0
NAUSEA: 0
SHORTNESS OF BREATH: 0
BACK PAIN: 0

## 2022-09-04 ASSESSMENT — PAIN SCALES - GENERAL: PAINLEVEL_OUTOF10: 0

## 2022-09-04 ASSESSMENT — PAIN - FUNCTIONAL ASSESSMENT: PAIN_FUNCTIONAL_ASSESSMENT: 0-10

## 2022-09-04 NOTE — ED PROVIDER NOTES
[address] [facility] ED  eMERGENCYdEPARTMENT eNCOUnter      Pt Name: Donte Samayoa  MRN: [id number]  [place] 1955  Date of evaluation: 9/4/2022  Radha Amin MD    CHIEF COMPLAINT           HPI  Donte Samayoa is a 78 y.o. male per chart review has a h/o HTN, CVA presents to the ED with dizziness. Pt notes gradual onset, moderate, constant, dizziness since last night. Pt denies fever, headache, cp, sob, ab pain, dysuria, diarrhea. ROS  Review of Systems   Constitutional:  Negative for activity change, chills and fever. HENT:  Negative for ear pain and sore throat. Eyes:  Negative for visual disturbance. Respiratory:  Negative for cough and shortness of breath. Cardiovascular:  Negative for chest pain, palpitations and leg swelling. Gastrointestinal:  Negative for abdominal pain, diarrhea, nausea and vomiting. Genitourinary:  Negative for dysuria. Musculoskeletal:  Negative for back pain. Skin:  Negative for rash. Neurological:  Positive for dizziness. Negative for weakness. Except as noted above the remainder of the review of systems was reviewed and negative. PAST MEDICAL HISTORY     Past Medical History:   Diagnosis Date    Allergic rhinitis     hayfever    Hypertension     Pilonidal cyst     Stroke [facility])     Tobacco use disorder          SURGICAL HISTORY     History reviewed. No pertinent surgical history.       CURRENTMEDICATIONS       Previous Medications    ASPIRIN 81 MG EC TABLET    Take 81 mg by mouth daily    CHLORHEXIDINE (PERIDEX) 0.12 % SOLUTION    SWISH & SPIT 1 TABLESPOONFUL (15 ML) BY MOUTH FOR 30 SECONDS TWICE DAILY (AFTER BRUSHING)    CLOPIDOGREL (PLAVIX) 75 MG TABLET    Take 1 tablet by mouth daily    DICLOFENAC SODIUM (VOLTAREN) 1 % GEL    Apply 2 g topically 4 times daily as needed for Pain    DONEPEZIL (ARICEPT) 10 MG TABLET    Take 1 tablet by mouth daily q  am    LOSARTAN (COZAAR) 100 MG TABLET    Take 1 tablet by mouth daily MEMANTINE (NAMENDA) 5 MG TABLET    TAKE 1 TABLET TWICE DAILY    METFORMIN (GLUCOPHAGE) 1000 MG TABLET    TAKE 1 TABLET BY MOUTH TWICE DAILY WITH MEALS    PIOGLITAZONE (ACTOS) 30 MG TABLET    Take 1 tablet by mouth daily    PRAVASTATIN (PRAVACHOL) 40 MG TABLET    Take 1 tablet by mouth daily       ALLERGIES     Sulfa antibiotics    FAMILY HISTORY       Family History   Problem Relation Age of Onset    Stroke Mother     High Blood Pressure Mother     High Blood Pressure Father     Stroke Maternal Grandmother     Stroke Maternal Grandfather     Liver Cancer Maternal Uncle           SOCIAL HISTORY       Social History     Socioeconomic History    Marital status:      Spouse name: None    Number of children: None    Years of education: None    Highest education level: None   Tobacco Use    Smoking status: Former     Packs/day: 0.50     Years: 37.00     Pack years: 18.50     Types: Cigarettes     Start date: 1976     Quit date: 2013     Years since quittin.8    Smokeless tobacco: Never   Substance and Sexual Activity    Alcohol use: Yes     Comment: social - only small amount on Friday, sometimes heavier on Saturday     Social Determinants of Health     Financial Resource Strain: Low Risk     Difficulty of Paying Living Expenses: Not hard at all   Food Insecurity: No Food Insecurity    Worried About Running Out of Food in the Last Year: Never true    Ran Out of Food in the Last Year: Never true   Transportation Needs: No Transportation Needs    Lack of Transportation (Medical): No    Lack of Transportation (Non-Medical): No         PHYSICAL EXAM       ED Triage Vitals [22 1100]   BP Temp Temp Source Heart Rate Resp SpO2 Height Weight   (!) 154/83 98.1 °F (36.7 °C) Oral 72 16 95 % -- 200 lb (90.7 kg)       Physical Exam  Vitals and nursing note reviewed. Constitutional:       Appearance: He is well-developed. HENT:      Head: Normocephalic.       Right Ear: External ear normal.      Left Ear:

## 2022-09-06 LAB
EKG ATRIAL RATE: 65 BPM
EKG P AXIS: 32 DEGREES
EKG P-R INTERVAL: 128 MS
EKG Q-T INTERVAL: 414 MS
EKG QRS DURATION: 94 MS
EKG QTC CALCULATION (BAZETT): 430 MS
EKG R AXIS: 68 DEGREES
EKG T AXIS: 52 DEGREES
EKG VENTRICULAR RATE: 65 BPM

## 2022-09-08 ENCOUNTER — OFFICE VISIT (OUTPATIENT)
Dept: FAMILY MEDICINE CLINIC | Age: 67
End: 2022-09-08
Payer: MEDICARE

## 2022-09-08 VITALS
TEMPERATURE: 98.5 F | HEART RATE: 82 BPM | HEIGHT: 69 IN | DIASTOLIC BLOOD PRESSURE: 76 MMHG | BODY MASS INDEX: 30.96 KG/M2 | SYSTOLIC BLOOD PRESSURE: 136 MMHG | WEIGHT: 209 LBS | OXYGEN SATURATION: 97 %

## 2022-09-08 DIAGNOSIS — M54.50 ACUTE MIDLINE LOW BACK PAIN WITHOUT SCIATICA: ICD-10-CM

## 2022-09-08 DIAGNOSIS — E11.65 TYPE 2 DIABETES MELLITUS WITH HYPERGLYCEMIA, WITHOUT LONG-TERM CURRENT USE OF INSULIN (HCC): ICD-10-CM

## 2022-09-08 DIAGNOSIS — I63.9 CEREBROVASCULAR ACCIDENT (CVA), UNSPECIFIED MECHANISM (HCC): ICD-10-CM

## 2022-09-08 DIAGNOSIS — Z91.81 AT HIGH RISK FOR FALLS: ICD-10-CM

## 2022-09-08 DIAGNOSIS — R42 DIZZINESS: ICD-10-CM

## 2022-09-08 DIAGNOSIS — S20.211A CONTUSION OF RIB ON RIGHT SIDE, INITIAL ENCOUNTER: Primary | ICD-10-CM

## 2022-09-08 DIAGNOSIS — I10 ESSENTIAL HYPERTENSION: ICD-10-CM

## 2022-09-08 DIAGNOSIS — I63.00 CEREBROVASCULAR ACCIDENT (CVA) DUE TO THROMBOSIS OF PRECEREBRAL ARTERY (HCC): ICD-10-CM

## 2022-09-08 DIAGNOSIS — M54.6 ACUTE MIDLINE THORACIC BACK PAIN: ICD-10-CM

## 2022-09-08 PROCEDURE — 99214 OFFICE O/P EST MOD 30 MIN: CPT | Performed by: FAMILY MEDICINE

## 2022-09-08 PROCEDURE — G8417 CALC BMI ABV UP PARAM F/U: HCPCS | Performed by: FAMILY MEDICINE

## 2022-09-08 PROCEDURE — 3044F HG A1C LEVEL LT 7.0%: CPT | Performed by: FAMILY MEDICINE

## 2022-09-08 PROCEDURE — 1123F ACP DISCUSS/DSCN MKR DOCD: CPT | Performed by: FAMILY MEDICINE

## 2022-09-08 PROCEDURE — G8427 DOCREV CUR MEDS BY ELIG CLIN: HCPCS | Performed by: FAMILY MEDICINE

## 2022-09-08 PROCEDURE — 2022F DILAT RTA XM EVC RTNOPTHY: CPT | Performed by: FAMILY MEDICINE

## 2022-09-08 PROCEDURE — 1036F TOBACCO NON-USER: CPT | Performed by: FAMILY MEDICINE

## 2022-09-08 PROCEDURE — 3017F COLORECTAL CA SCREEN DOC REV: CPT | Performed by: FAMILY MEDICINE

## 2022-09-08 RX ORDER — LOSARTAN POTASSIUM 100 MG/1
100 TABLET ORAL DAILY
Qty: 30 TABLET | Refills: 5 | Status: SHIPPED | OUTPATIENT
Start: 2022-09-08

## 2022-09-08 RX ORDER — PRAVASTATIN SODIUM 40 MG
40 TABLET ORAL DAILY
Qty: 30 TABLET | Refills: 5 | Status: SHIPPED | OUTPATIENT
Start: 2022-09-08

## 2022-09-08 RX ORDER — DIAZEPAM 5 MG/1
5 TABLET ORAL DAILY PRN
Qty: 5 TABLET | Refills: 0 | Status: SHIPPED | OUTPATIENT
Start: 2022-09-08 | End: 2022-09-13

## 2022-09-08 RX ORDER — CLOPIDOGREL BISULFATE 75 MG/1
75 TABLET ORAL DAILY
Qty: 30 TABLET | Refills: 5 | Status: SHIPPED | OUTPATIENT
Start: 2022-09-08

## 2022-09-08 RX ORDER — PIOGLITAZONEHYDROCHLORIDE 30 MG/1
30 TABLET ORAL DAILY
Qty: 30 TABLET | Refills: 5 | Status: SHIPPED | OUTPATIENT
Start: 2022-09-08

## 2022-09-08 RX ORDER — MECLIZINE HYDROCHLORIDE 25 MG/1
25 TABLET ORAL 3 TIMES DAILY
Qty: 30 TABLET | Refills: 0 | Status: SHIPPED | OUTPATIENT
Start: 2022-09-08 | End: 2022-09-18

## 2022-09-08 RX ORDER — DONEPEZIL HYDROCHLORIDE 10 MG/1
10 TABLET, FILM COATED ORAL DAILY
Qty: 30 TABLET | Refills: 5 | Status: SHIPPED | OUTPATIENT
Start: 2022-09-08

## 2022-09-08 RX ORDER — MEMANTINE HYDROCHLORIDE 5 MG/1
TABLET ORAL
Qty: 60 TABLET | Refills: 5 | Status: SHIPPED | OUTPATIENT
Start: 2022-09-08

## 2022-09-08 NOTE — PROGRESS NOTES
Chief Complaint   Patient presents with    Dizziness     [facility], feel 3 times yesterday, may have cracked rib falling in shower, still having vertigo        HPI:  Cate Wong is a 78 y.o. male    Here with wife    ED follow up for dizziness    Still dizzy  [place] yesterday in the shower     Hx CVA, HTN, dyslipidemia, diabetes    Has been refusing to return to neurology             Lab Results   Component Value Date    LABA1C 6.1 (H) 2022     No results found for: EAG      Social History     Socioeconomic History    Marital status:      Spouse name: None    Number of children: None    Years of education: None    Highest education level: None   Tobacco Use    Smoking status: Former     Packs/day: 0.50     Years: 37.00     Pack years: 18.50     Types: Cigarettes     Start date: 1976     Quit date: 2013     Years since quittin.8    Smokeless tobacco: Never   Substance and Sexual Activity    Alcohol use: Yes     Comment: social - only small amount on Friday, sometimes heavier on Saturday     Social Determinants of Health     Financial Resource Strain: Low Risk     Difficulty of Paying Living Expenses: Not hard at all   Food Insecurity: No Food Insecurity    Worried About [address] in the Last Year: Never true    [address] [employer] in the Last Year: Never true   Transportation Needs: No Transportation Needs    Lack of Transportation (Medical): No    Lack of Transportation (Non-Medical): No         MRI from past   IMPRESSION ACUTE NONHEMORRHAGIC AREA OF ISCHEMIC INFARCTION INVOLVING THE POSTERIOR LEFT BASAL GANGLIA IN THE REGION OF THE GLOBUS PALLIDUS.     Wt Readings from Last 3 Encounters:   22 109 lb 6.4 oz (49.6 kg)   22 200 lb (90.7 kg)   22 210 lb 9.6 oz (95.5 kg)       Patient Active Problem List   Diagnosis    Allergic rhinitis    Hypertension    Small vessel cerebrovascular accident (CVA) ([facility] [address]    Pilonidal cyst    Type 2 diabetes mellitus with hyperglycemia, without long-term current use of insulin (HCC)    Vascular dementia without behavioral disturbance (HCC)    Memory loss       Current Outpatient Medications   Medication Sig Dispense Refill    clopidogrel (PLAVIX) 75 MG tablet Take 1 tablet by mouth daily 30 tablet 5    diazePAM (VALIUM) 5 MG tablet Take 1 tablet by mouth daily as needed (Dizziness) for up to 5 days. 5 tablet 0    diclofenac sodium (VOLTAREN) 1 % GEL Apply 2 g topically 4 times daily as needed for Pain 100 g 1    donepezil (ARICEPT) 10 MG tablet Take 1 tablet by mouth daily q  am 30 tablet 5    losartan (COZAAR) 100 MG tablet Take 1 tablet by mouth daily 30 tablet 5    memantine (NAMENDA) 5 MG tablet TAKE 1 TABLET TWICE DAILY 60 tablet 5    metFORMIN (GLUCOPHAGE) 1000 MG tablet TAKE 1 TABLET BY MOUTH TWICE DAILY WITH MEALS 60 tablet 5    meclizine (ANTIVERT) 25 MG tablet Take 1 tablet by mouth in the morning, at noon, and at bedtime for 10 days 30 tablet 0    pioglitazone (ACTOS) 30 MG tablet Take 1 tablet by mouth daily 30 tablet 5    pravastatin (PRAVACHOL) 40 MG tablet Take 1 tablet by mouth daily 30 tablet 5    chlorhexidine (PERIDEX) 0.12 % solution SWISH & SPIT 1 TABLESPOONFUL (15 ML) BY MOUTH FOR 30 SECONDS TWICE DAILY (AFTER BRUSHING)      aspirin 81 MG EC tablet Take 81 mg by mouth daily       No current facility-administered medications for this visit. Patient's medications, allergies, past medical, surgical, social and family histories were reviewed and updated as appropriate.     Review of Systems:   General ROS: negative for - chills, fatigue, fever, malaise, weight gain or weight loss  Respiratory ROS: no cough, shortness of breath, or wheezing  Cardiovascular ROS: no chest pain or dyspnea on exertion  Gastrointestinal ROS: no abdominal pain, change in bowel habits, or black or bloody stools  Genito-Urinary ROS: no dysuria, trouble voiding  Musculoskeletal ROS: negative for - gait disturbance, joint pain or joint stiffness  Neurological ROS: normal speech today, vertigo     In general patient otherwise reports feeling well. Physical Exam:  /76 (Site: Left Upper Arm)   Pulse 82   Temp 98.5 °F (36.9 °C)   Ht 5' 9\" (1.753 m)   Wt 109 lb 6.4 oz (49.6 kg)   SpO2 97%   BMI 16.16 kg/m²     Gen: Well, NAD, Alert, Oriented x 3   HEENT: EOMI, eyes clear, MMM  Skin: SKs on back   Neck: no significant lymphadenopathy or thyromegaly  Lungs: CTA B w/out Rales/Wheezes/Rhonchi, Good respiratory effort   Heart: RRR, S1S2, w/out M/R/G, non-displaced PMI   Ext: No C/C/E Bilaterally. Neuro: left eyelid droop, ataxic gait   Psych: euthymic         Lab Results   Component Value Date    WBC 6.9 09/04/2022    HGB 13.7 (L) 09/04/2022    HCT 40.5 (L) 09/04/2022     09/04/2022    CHOL 135 01/10/2022    TRIG 81 01/10/2022    HDL 33 (L) 01/10/2022    ALT 16 09/04/2022    AST 20 09/04/2022     09/04/2022    K 4.0 09/04/2022     09/04/2022    CREATININE 1.03 09/04/2022    BUN 9 09/04/2022    CO2 25 09/04/2022    TSH 2.650 12/13/2020    PSA 1.52 12/13/2020    INR 1.0 12/13/2021    LABA1C 6.1 (H) 03/22/2022    LABMICR 4.10 (H) 11/15/2021         A&P   Diagnosis Orders   1. Contusion of rib on right side, initial encounter  XR RIBS RIGHT (2 VIEWS)      2. Cerebrovascular accident (CVA), unspecified mechanism ([address]  clopidogrel (PLAVIX) 75 MG tablet      3. Dizziness  diazePAM (VALIUM) 5 MG tablet      4. Acute midline thoracic back pain  diclofenac sodium (VOLTAREN) 1 % GEL      5. Acute midline low back pain without sciatica  diclofenac sodium (VOLTAREN) 1 % GEL      6. Essential hypertension  losartan (COZAAR) 100 MG tablet    pravastatin (PRAVACHOL) 40 MG tablet      7. Type 2 diabetes mellitus with hyperglycemia, without long-term current use of insulin (HCC)  metFORMIN (GLUCOPHAGE) 1000 MG tablet    pioglitazone (ACTOS) 30 MG tablet      8.  Cerebrovascular accident (CVA) due to thrombosis of precerebral artery [facility] pravastatin (PRAVACHOL) 40 MG tablet      9. At high risk for falls             See orders  All refills given     Lynnda Leventhal, shower chair    See pt instructions    Ed notes reviewed    Really should be seeing neurology     Wt Readings from Last 3 Encounters:   09/08/22 109 lb 6.4 oz (49.6 kg)   09/04/22 200 lb (90.7 kg)   08/18/22 210 lb 9.6 oz (95.5 kg)             Josph Rubinstein, MD                On the basis of positive falls risk screening, assessment and plan is as follows: home safety tips provided.

## 2022-09-12 ENCOUNTER — OFFICE VISIT (OUTPATIENT)
Dept: FAMILY MEDICINE CLINIC | Age: 67
End: 2022-09-12
Payer: MEDICARE

## 2022-09-12 VITALS
HEIGHT: 69 IN | HEART RATE: 76 BPM | DIASTOLIC BLOOD PRESSURE: 82 MMHG | OXYGEN SATURATION: 98 % | BODY MASS INDEX: 30.96 KG/M2 | WEIGHT: 209 LBS | SYSTOLIC BLOOD PRESSURE: 130 MMHG
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
## 2022-09-12 PROCEDURE — 3017F COLORECTAL CA SCREEN DOC REV: CPT | Performed by: PHYSICIAN ASSISTANT

## 2022-09-12 PROCEDURE — 1036F TOBACCO NON-USER: CPT | Performed by: PHYSICIAN ASSISTANT

## 2022-09-12 PROCEDURE — G8427 DOCREV CUR MEDS BY ELIG CLIN: HCPCS | Performed by: PHYSICIAN ASSISTANT

## 2022-09-12 PROCEDURE — G8417 CALC BMI ABV UP PARAM F/U: HCPCS | Performed by: PHYSICIAN ASSISTANT

## 2022-09-12 PROCEDURE — 1123F ACP DISCUSS/DSCN MKR DOCD: CPT | Performed by: PHYSICIAN ASSISTANT

## 2022-09-12 PROCEDURE — 99213 OFFICE O/P EST LOW 20 MIN: CPT | Performed by: PHYSICIAN ASSISTANT

## 2022-09-12 ASSESSMENT — VISUAL ACUITY: OU: 1

## 2022-09-12 ASSESSMENT — ENCOUNTER SYMPTOMS
SINUS PRESSURE: 0
ABDOMINAL PAIN: 0
SHORTNESS OF BREATH: 0
VOMITING: 0
COUGH: 0
VISUAL CHANGE: 0
CHEST TIGHTNESS: 0
NAUSEA: 0
SORE THROAT: 0
SINUS PAIN: 0
BOWEL INCONTINENCE: 0
BACK PAIN: 0
DIARRHEA: 0

## 2022-09-12 NOTE — PROGRESS NOTES
[address] [facility] Encounter  CHIEF COMPLAINT       Chief Complaint   Patient presents with    Other     Left foot second toe bruised - pt is diabetic. HISTORY OF PRESENT ILLNESS   Edmond Ramírez is a 78 y.o. male who presents with:  Fall  The accident occurred 3 to 5 days ago. The fall occurred while walking. He fell from a height of 1 to 2 ft. He landed on [place]. There was no blood loss. The point of impact was the left foot. The pain is present in the left foot. The pain is moderate. Pertinent negatives include no abdominal pain, bowel incontinence, fever, headaches, hearing loss, hematuria, loss of consciousness, nausea, numbness, tingling, visual change or vomiting. He has tried nothing for the symptoms. REVIEW OF SYSTEMS     Review of Systems   Constitutional:  Negative for activity change, appetite change, chills and fever. HENT:  Negative for congestion, drooling, sinus pressure, sinus pain and sore throat. Eyes:  Negative for visual disturbance. Respiratory:  Negative for cough, chest tightness and shortness of breath. Cardiovascular:  Negative for chest pain. Gastrointestinal:  Negative for abdominal pain, bowel incontinence, diarrhea, nausea and vomiting. Endocrine: Negative for cold intolerance. Genitourinary:  Negative for dysuria, flank pain, frequency and hematuria. Musculoskeletal:  Positive for arthralgias (left foot). Negative for back pain. Skin:  Negative for rash. Allergic/Immunologic: Negative for food allergies. Neurological:  Negative for tingling, loss of consciousness, weakness, light-headedness, numbness and headaches. Hematological:  Does not bruise/bleed easily. PAST MEDICAL HISTORY         Diagnosis Date    Allergic rhinitis     hayfever    Hypertension     Pilonidal cyst     Stroke [facility])     Tobacco use disorder      SURGICAL HISTORY     Patient  has no past surgical history on file.   CURRENT MEDICATIONS Previous Medications    ASPIRIN 81 MG EC TABLET    Take 81 mg by mouth daily    CHLORHEXIDINE (PERIDEX) 0.12 % SOLUTION    SWISH & SPIT 1 TABLESPOONFUL (15 ML) BY MOUTH FOR 30 SECONDS TWICE DAILY (AFTER BRUSHING)    CLOPIDOGREL (PLAVIX) 75 MG TABLET    Take 1 tablet by mouth daily    DIAZEPAM (VALIUM) 5 MG TABLET    Take 1 tablet by mouth daily as needed (Dizziness) for up to 5 days. DICLOFENAC SODIUM (VOLTAREN) 1 % GEL    Apply 2 g topically 4 times daily as needed for Pain    DONEPEZIL (ARICEPT) 10 MG TABLET    Take 1 tablet by mouth daily q  am    LOSARTAN (COZAAR) 100 MG TABLET    Take 1 tablet by mouth daily    MECLIZINE (ANTIVERT) 25 MG TABLET    Take 1 tablet by mouth in the morning, at noon, and at bedtime for 10 days    MEMANTINE (NAMENDA) 5 MG TABLET    TAKE 1 TABLET TWICE DAILY    METFORMIN (GLUCOPHAGE) 1000 MG TABLET    TAKE 1 TABLET BY MOUTH TWICE DAILY WITH MEALS    PIOGLITAZONE (ACTOS) 30 MG TABLET    Take 1 tablet by mouth daily    PRAVASTATIN (PRAVACHOL) 40 MG TABLET    Take 1 tablet by mouth daily     ALLERGIES     Patient is is allergic to sulfa antibiotics. FAMILY HISTORY     Patient'sfamily history includes High Blood Pressure in his father and mother; Liver Cancer in his maternal uncle; Stroke in his maternal grandfather, maternal grandmother, and mother. SOCIAL HISTORY     Patient  reports that he quit smoking about 8 years ago. His smoking use included cigarettes. He started smoking about 46 years ago. He has a 18.50 pack-year smoking history. He has never used smokeless tobacco. He reports current alcohol use. PHYSICAL EXAM     VITALS  BP: 130/82,  , Heart Rate: 76,  , SpO2: 98 %  Physical Exam  Vitals and nursing note reviewed. Constitutional:       General: He is awake. He is not in acute distress. Appearance: Normal appearance. He is well-developed. He is not ill-appearing, toxic-appearing or diaphoretic. HENT:      Head: Normocephalic and atraumatic.       Right Ear: Hearing and external ear normal.      Left Ear: Hearing and external ear normal.      Nose: Nose normal.   Eyes:      General: Lids are normal. Vision grossly intact. Gaze aligned appropriately. Conjunctiva/sclera: Conjunctivae normal.   Cardiovascular:      Rate and Rhythm: Normal rate and regular rhythm. Pulses: Normal pulses. Heart sounds: Normal heart sounds, S1 normal and S2 normal.   Pulmonary:      Effort: Pulmonary effort is normal.      Breath sounds: Normal breath sounds and air entry. Musculoskeletal:      Cervical back: Normal range of motion. Feet:    Feet:      Comments: Ecchymosis of the left second toe at the DIP joint. Nailbed intact. Patient states he still maintains sensation. Distal pulse intact. Skin:     General: Skin is warm. Capillary Refill: Capillary refill takes less than 2 seconds. Neurological:      Mental Status: He is alert and oriented to person, place, and time. Gait: Gait is intact. Psychiatric:         Attention and Perception: Attention normal.         Mood and Affect: Mood normal.         Speech: Speech normal.         Behavior: Behavior normal. Behavior is cooperative. READY CARE COURSE   Labs:  No results found for this visit on 09/12/22. IMAGING:  No orders to display     Scheduled Meds:  Continuous Infusions:  PRN Meds:. PROCEDURES:  FINAL IMPRESSION      1. Fall, initial encounter      DISPOSITION/PLAN     Recommend XR of the left toe to evaluate for fracture. Informed patient that if fracture, he could sabino tape the 2 and 3 toe together. Continue supportive treatment for pain. Possible referral to ortho if warranted. Discussed signs and symptoms which require immediate follow-up in ED/call to 911. Patient verbalized understanding.     On this date 9/12/2022 I have spent 20 minutes reviewing previous notes, test results and face to face with the patient discussing the diagnosis and importance of compliance with the treatment plan

## 2022-09-22 ENCOUNTER — OFFICE VISIT (OUTPATIENT)
Dept: FAMILY MEDICINE CLINIC | Age: 67
End: 2022-09-22
Payer: MEDICARE

## 2022-09-22 VITALS
DIASTOLIC BLOOD PRESSURE: 80 MMHG | HEIGHT: 69 IN | SYSTOLIC BLOOD PRESSURE: 122 MMHG | OXYGEN SATURATION: 96 % | HEART RATE: 74 BPM | WEIGHT: 285 LBS | BODY MASS INDEX: 42.21 KG/M2 | TEMPERATURE: 97.4 F

## 2022-09-22 DIAGNOSIS — F01.50 VASCULAR DEMENTIA WITHOUT BEHAVIORAL DISTURBANCE (HCC): ICD-10-CM

## 2022-09-22 DIAGNOSIS — I63.9 CEREBROVASCULAR ACCIDENT (CVA), UNSPECIFIED MECHANISM (HCC): ICD-10-CM

## 2022-09-22 DIAGNOSIS — E11.65 TYPE 2 DIABETES MELLITUS WITH HYPERGLYCEMIA, WITHOUT LONG-TERM CURRENT USE OF INSULIN (HCC): ICD-10-CM

## 2022-09-22 DIAGNOSIS — I63.00 CEREBROVASCULAR ACCIDENT (CVA) DUE TO THROMBOSIS OF PRECEREBRAL ARTERY (HCC): ICD-10-CM

## 2022-09-22 DIAGNOSIS — I10 ESSENTIAL HYPERTENSION: ICD-10-CM

## 2022-09-22 DIAGNOSIS — H51.8 SKEW DEVIATION OF LEFT EYE: Primary | ICD-10-CM

## 2022-09-22 LAB — HBA1C MFR BLD: 6 % (ref 4.8–5.9)

## 2022-09-22 PROCEDURE — 99214 OFFICE O/P EST MOD 30 MIN: CPT | Performed by: FAMILY MEDICINE

## 2022-09-22 PROCEDURE — 2022F DILAT RTA XM EVC RTNOPTHY: CPT | Performed by: FAMILY MEDICINE

## 2022-09-22 PROCEDURE — 3017F COLORECTAL CA SCREEN DOC REV: CPT | Performed by: FAMILY MEDICINE

## 2022-09-22 PROCEDURE — G8427 DOCREV CUR MEDS BY ELIG CLIN: HCPCS | Performed by: FAMILY MEDICINE

## 2022-09-22 PROCEDURE — 1036F TOBACCO NON-USER: CPT | Performed by: FAMILY MEDICINE

## 2022-09-22 PROCEDURE — 1123F ACP DISCUSS/DSCN MKR DOCD: CPT | Performed by: FAMILY MEDICINE

## 2022-09-22 PROCEDURE — 3044F HG A1C LEVEL LT 7.0%: CPT | Performed by: FAMILY MEDICINE

## 2022-09-22 PROCEDURE — G8417 CALC BMI ABV UP PARAM F/U: HCPCS | Performed by: FAMILY MEDICINE

## 2022-09-22 ASSESSMENT — PATIENT HEALTH QUESTIONNAIRE - PHQ9
SUM OF ALL RESPONSES TO PHQ QUESTIONS 1-9: 0
SUM OF ALL RESPONSES TO PHQ9 QUESTIONS 1 & 2: 0
SUM OF ALL RESPONSES TO PHQ QUESTIONS 1-9: 0
1. LITTLE INTEREST OR PLEASURE IN DOING THINGS: 0
2. FEELING DOWN, DEPRESSED OR HOPELESS: 0

## 2022-09-22 ASSESSMENT — LIFESTYLE VARIABLES
HOW OFTEN DO YOU HAVE A DRINK CONTAINING ALCOHOL: NEVER
HOW MANY STANDARD DRINKS CONTAINING ALCOHOL DO YOU HAVE ON A TYPICAL DAY: PATIENT DOES NOT DRINK

## 2022-09-22 NOTE — PROGRESS NOTES
Chief Complaint   Patient presents with    Eye Problem     Refused to open left eye, little swollen    Blood Sugar Problem     Running high        HPI:  Marianna Sanchez is a 78 y.o. male    Here with wife      Hx CVA, HTN, dyslipidemia, diabetes    Has been refusing to return to neurology       Left eye is deviating to the left  Having trouble opening left eye    Was in ER 9/4 with dizziness  Has been going on since then     CT head showed no significant change, but was not admitted, no MRI    Lab Results   Component Value Date    LABA1C 6.1 (H) 2022     No results found for: EAG      Social History     Socioeconomic History    Marital status:      Spouse name: None    Number of children: None    Years of education: None    Highest education level: None   Tobacco Use    Smoking status: Former     Packs/day: 0.50     Years: 37.00     Pack years: 18.50     Types: Cigarettes     Start date: 1976     Quit date: 2013     Years since quittin.8    Smokeless tobacco: Never   Substance and Sexual Activity    Alcohol use: Yes     Comment: social - only small amount on Friday, sometimes heavier on Saturday     Social Determinants of Health     Financial Resource Strain: Low Risk     Difficulty of Paying Living Expenses: Not hard at all   Food Insecurity: No Food Insecurity    Worried About Running Out of Food in the Last Year: Never true    Ran Out of Food in the Last Year: Never true   Transportation Needs: No Transportation Needs    Lack of Transportation (Medical): No    Lack of Transportation (Non-Medical): No         MRI from past   IMPRESSION ACUTE NONHEMORRHAGIC AREA OF ISCHEMIC INFARCTION INVOLVING THE POSTERIOR LEFT BASAL GANGLIA IN THE REGION OF THE GLOBUS PALLIDUS.     Wt Readings from Last 3 Encounters:   22 285 lb (129.3 kg)   22 209 lb (94.8 kg)   22 209 lb (94.8 kg)       Patient Active Problem List   Diagnosis    Allergic rhinitis    Hypertension    Small vessel cerebrovascular accident (CVA) ([address]    Pilonidal cyst    Type 2 diabetes mellitus with hyperglycemia, without long-term current use of insulin (HCC)    Vascular dementia without behavioral disturbance (HCC)    Memory loss       Current Outpatient Medications   Medication Sig Dispense Refill    clopidogrel (PLAVIX) 75 MG tablet Take 1 tablet by mouth daily 30 tablet 5    donepezil (ARICEPT) 10 MG tablet Take 1 tablet by mouth daily q  am 30 tablet 5    losartan (COZAAR) 100 MG tablet Take 1 tablet by mouth daily 30 tablet 5    memantine (NAMENDA) 5 MG tablet TAKE 1 TABLET TWICE DAILY 60 tablet 5    metFORMIN (GLUCOPHAGE) 1000 MG tablet TAKE 1 TABLET BY MOUTH TWICE DAILY WITH MEALS 60 tablet 5    pioglitazone (ACTOS) 30 MG tablet Take 1 tablet by mouth daily 30 tablet 5    pravastatin (PRAVACHOL) 40 MG tablet Take 1 tablet by mouth daily 30 tablet 5    chlorhexidine (PERIDEX) 0.12 % solution SWISH & SPIT 1 TABLESPOONFUL (15 ML) BY MOUTH FOR 30 SECONDS TWICE DAILY (AFTER BRUSHING)      aspirin 81 MG EC tablet Take 81 mg by mouth daily       No current facility-administered medications for this visit. Patient's medications, allergies, past medical, surgical, social and family histories were reviewed and updated as appropriate. Review of Systems:   General ROS: negative for - chills, fatigue, fever, malaise, weight gain or weight loss  Respiratory ROS: no cough, shortness of breath, or wheezing  Cardiovascular ROS: no chest pain or dyspnea on exertion  Gastrointestinal ROS: no abdominal pain, change in bowel habits, or black or bloody stools  Genito-Urinary ROS: no dysuria, trouble voiding  Musculoskeletal ROS: negative for - gait disturbance, joint pain or joint stiffness  Neurological ROS: normal speech today, vertigo     In general patient otherwise reports feeling well.      Physical Exam:  /80 (Site: Right Upper Arm)   Pulse 74   Temp 97.4 °F (36.3 °C)   Ht 5' 9\" (1.753 m)   Wt 285 lb (129.3 kg) SpO2 96%   BMI 42.09 kg/m²     Gen: Well, NAD, Alert, Oriented x 3   HEENT: EOMI, eyes clear, MMM  Skin: SKs on back   Neck: no significant lymphadenopathy or thyromegaly  Lungs: CTA B w/out Rales/Wheezes/Rhonchi, Good respiratory effort   Heart: RRR, S1S2, w/out M/R/G, non-displaced PMI   Ext: No C/C/E Bilaterally. Neuro: left eyelid droop, ataxic gait. Lateral deviation left eye   Psych: euthymic         Lab Results   Component Value Date    WBC 6.9 09/04/2022    HGB 13.7 (L) 09/04/2022    HCT 40.5 (L) 09/04/2022     09/04/2022    CHOL 135 01/10/2022    TRIG 81 01/10/2022    HDL 33 (L) 01/10/2022    ALT 16 09/04/2022    AST 20 09/04/2022     09/04/2022    K 4.0 09/04/2022     09/04/2022    CREATININE 1.03 09/04/2022    BUN 9 09/04/2022    CO2 25 09/04/2022    TSH 2.650 12/13/2020    PSA 1.52 12/13/2020    INR 1.0 12/13/2021    LABA1C 6.1 (H) 03/22/2022    LABMICR 4.10 (H) 11/15/2021         A&P   Diagnosis Orders   1. Skew deviation of left eye  MRI BRAIN Freddie Maradiaga MD, Ophthalmology, Kasandra Sidhu MD, Neurology, [place]      2. Cerebrovascular accident (CVA), unspecified mechanism [facility])  Haja Carter MD, Neurology, [place]      3. Type 2 diabetes mellitus with hyperglycemia, without long-term current use of insulin (HCC)  Hemoglobin A1C      4. Essential hypertension        5.  Cerebrovascular accident (CVA) due to thrombosis of precerebral artery ([address]        6. Vascular dementia without behavioral disturbance ([address]             See orders    Walker, shower chair    See pt instructions    Continue aspirin and plavix    High likelihood that he has had another infarct leading to persistent dizziness and deviation of gaze in his left eye    Will get MRI     Referral back to neuro  Referral to ophtho     Wt Readings from Last 3 Encounters:   09/22/22 285 lb (129.3 kg)   09/12/22 209 lb (94.8 kg) 09/08/22 209 lb (94.8 kg)             Deepa Zuniga MD                On the basis of positive falls risk screening, assessment and plan is as follows: home safety tips provided.

## 2022-09-23 ENCOUNTER — HOSPITAL ENCOUNTER (OUTPATIENT)
Dept: MRI IMAGING | Age: 67
Discharge: HOME | End: 2022-09-25
Payer: MEDICARE

## 2022-09-23 DIAGNOSIS — H51.8 SKEW DEVIATION OF LEFT EYE: ICD-10-CM

## 2022-09-23 DIAGNOSIS — I63.9 CEREBROVASCULAR ACCIDENT (CVA), UNSPECIFIED MECHANISM (HCC): ICD-10-CM

## 2022-09-23 PROCEDURE — 6360000004 HC RX CONTRAST MEDICATION: Performed by: FAMILY MEDICINE

## 2022-09-23 PROCEDURE — A9579 GAD-BASE MR CONTRAST NOS,1ML: HCPCS | Performed by: FAMILY MEDICINE

## 2022-09-23 PROCEDURE — 70553 MRI BRAIN STEM W/O & W/DYE: CPT

## 2022-09-23 RX ORDER — SODIUM CHLORIDE 0.9 % (FLUSH) 0.9 %
10 SYRINGE (ML) INJECTION PRN
Status: DISCONTINUED | OUTPATIENT
Start: 2022-09-23 | End: 2022-09-26 | Stop reason: HOSPADM

## 2022-09-23 RX ADMIN — GADOTERIDOL 20 ML: 279.3 INJECTION, SOLUTION INTRAVENOUS at 13:35

## 2022-10-06 ENCOUNTER — TELEPHONE (OUTPATIENT)
Dept: FAMILY MEDICINE CLINIC | Age: 67
End: 2022-10-06

## 2022-10-06 DIAGNOSIS — R90.89 ABNORMAL FINDING ON MRI OF BRAIN: Primary | ICD-10-CM

## 2022-10-19 ENCOUNTER — HOSPITAL ENCOUNTER (OUTPATIENT)
Dept: MRI IMAGING | Age: 67
Discharge: HOME | End: 2022-10-21
Payer: MEDICARE

## 2022-10-19 DIAGNOSIS — R90.89 ABNORMAL FINDING ON MRI OF BRAIN: ICD-10-CM

## 2022-10-19 PROCEDURE — 70544 MR ANGIOGRAPHY HEAD W/O DYE: CPT

## 2022-10-27 ENCOUNTER — OFFICE VISIT (OUTPATIENT)
Dept: FAMILY MEDICINE CLINIC | Age: 67
End: 2022-10-27
Payer: MEDICARE

## 2022-10-27 VITALS
HEART RATE: 79 BPM | WEIGHT: 202 LBS | TEMPERATURE: 96.5 F | OXYGEN SATURATION: 95 % | HEIGHT: 69 IN | DIASTOLIC BLOOD PRESSURE: 88 MMHG | BODY MASS INDEX: 29.92 KG/M2 | SYSTOLIC BLOOD PRESSURE: 130 MMHG

## 2022-10-27 DIAGNOSIS — E11.65 TYPE 2 DIABETES MELLITUS WITH HYPERGLYCEMIA, WITHOUT LONG-TERM CURRENT USE OF INSULIN (HCC): ICD-10-CM

## 2022-10-27 DIAGNOSIS — I65.01 VERTEBRAL ARTERY OCCLUSION, RIGHT: Primary | ICD-10-CM

## 2022-10-27 DIAGNOSIS — I63.00 CEREBROVASCULAR ACCIDENT (CVA) DUE TO THROMBOSIS OF PRECEREBRAL ARTERY (HCC): ICD-10-CM

## 2022-10-27 DIAGNOSIS — I63.9 CEREBROVASCULAR ACCIDENT (CVA), UNSPECIFIED MECHANISM (HCC): ICD-10-CM

## 2022-10-27 DIAGNOSIS — I10 ESSENTIAL HYPERTENSION: ICD-10-CM

## 2022-10-27 PROCEDURE — G8484 FLU IMMUNIZE NO ADMIN: HCPCS | Performed by: FAMILY MEDICINE

## 2022-10-27 PROCEDURE — 1036F TOBACCO NON-USER: CPT | Performed by: FAMILY MEDICINE

## 2022-10-27 PROCEDURE — 99213 OFFICE O/P EST LOW 20 MIN: CPT | Performed by: FAMILY MEDICINE

## 2022-10-27 PROCEDURE — 3078F DIAST BP <80 MM HG: CPT | Performed by: FAMILY MEDICINE

## 2022-10-27 PROCEDURE — 3017F COLORECTAL CA SCREEN DOC REV: CPT | Performed by: FAMILY MEDICINE

## 2022-10-27 PROCEDURE — 2022F DILAT RTA XM EVC RTNOPTHY: CPT | Performed by: FAMILY MEDICINE

## 2022-10-27 PROCEDURE — G8427 DOCREV CUR MEDS BY ELIG CLIN: HCPCS | Performed by: FAMILY MEDICINE

## 2022-10-27 PROCEDURE — G8417 CALC BMI ABV UP PARAM F/U: HCPCS | Performed by: FAMILY MEDICINE

## 2022-10-27 PROCEDURE — 3044F HG A1C LEVEL LT 7.0%: CPT | Performed by: FAMILY MEDICINE

## 2022-10-27 PROCEDURE — 3074F SYST BP LT 130 MM HG: CPT | Performed by: FAMILY MEDICINE

## 2022-10-27 PROCEDURE — 1123F ACP DISCUSS/DSCN MKR DOCD: CPT | Performed by: FAMILY MEDICINE

## 2022-10-27 RX ORDER — MEMANTINE HYDROCHLORIDE 5 MG/1
TABLET ORAL
Qty: 60 TABLET | Refills: 5 | Status: CANCELLED | OUTPATIENT
Start: 2022-10-27

## 2022-10-27 RX ORDER — CLOPIDOGREL BISULFATE 75 MG/1
75 TABLET ORAL DAILY
Qty: 30 TABLET | Refills: 5 | Status: CANCELLED | OUTPATIENT
Start: 2022-10-27

## 2022-10-27 RX ORDER — DONEPEZIL HYDROCHLORIDE 10 MG/1
10 TABLET, FILM COATED ORAL DAILY
Qty: 30 TABLET | Refills: 5 | Status: CANCELLED | OUTPATIENT
Start: 2022-10-27

## 2022-10-27 RX ORDER — PRAVASTATIN SODIUM 40 MG
40 TABLET ORAL DAILY
Qty: 30 TABLET | Refills: 5 | Status: CANCELLED | OUTPATIENT
Start: 2022-10-27

## 2022-10-27 RX ORDER — PIOGLITAZONEHYDROCHLORIDE 30 MG/1
30 TABLET ORAL DAILY
Qty: 30 TABLET | Refills: 5 | Status: CANCELLED | OUTPATIENT
Start: 2022-10-27

## 2022-10-27 RX ORDER — LOSARTAN POTASSIUM 100 MG/1
100 TABLET ORAL DAILY
Qty: 30 TABLET | Refills: 5 | Status: CANCELLED | OUTPATIENT
Start: 2022-10-27

## 2022-10-27 NOTE — PROGRESS NOTES
Diagnosis    Allergic rhinitis    Hypertension    Small vessel cerebrovascular accident (CVA) ([facility]    Pilonidal cyst    Type 2 diabetes mellitus with hyperglycemia, without long-term current use of insulin (HCC)    Vascular dementia without behavioral disturbance (HCC)    Memory loss       Current Outpatient Medications   Medication Sig Dispense Refill    clopidogrel (PLAVIX) 75 MG tablet Take 1 tablet by mouth daily 30 tablet 5    donepezil (ARICEPT) 10 MG tablet Take 1 tablet by mouth daily q  am 30 tablet 5    losartan (COZAAR) 100 MG tablet Take 1 tablet by mouth daily 30 tablet 5    memantine (NAMENDA) 5 MG tablet TAKE 1 TABLET TWICE DAILY 60 tablet 5    metFORMIN (GLUCOPHAGE) 1000 MG tablet TAKE 1 TABLET BY MOUTH TWICE DAILY WITH MEALS 60 tablet 5    pioglitazone (ACTOS) 30 MG tablet Take 1 tablet by mouth daily 30 tablet 5    pravastatin (PRAVACHOL) 40 MG tablet Take 1 tablet by mouth daily 30 tablet 5    chlorhexidine (PERIDEX) 0.12 % solution SWISH & SPIT 1 TABLESPOONFUL (15 ML) BY MOUTH FOR 30 SECONDS TWICE DAILY (AFTER BRUSHING)      aspirin 81 MG EC tablet Take 81 mg by mouth daily       No current facility-administered medications for this visit. Patient's medications, allergies, past medical, surgical, social and family histories were reviewed and updated as appropriate. Review of Systems:   General ROS: negative for - chills, fatigue, fever, malaise, weight gain or weight loss  Respiratory ROS: no cough, shortness of breath, or wheezing  Cardiovascular ROS: no chest pain or dyspnea on exertion  Gastrointestinal ROS: no abdominal pain, change in bowel habits, or black or bloody stools  Genito-Urinary ROS: no dysuria, trouble voiding  Musculoskeletal ROS: negative for - gait disturbance, joint pain or joint stiffness  Neurological ROS: normal speech today, vertigo     In general patient otherwise reports feeling well.      Physical Exam:  /88 (Site: Left Upper Arm)   Pulse 79   Temp [place] ) 96.5 °F (35.8 °C)   Ht 5' 9\" (1.753 m)   Wt 202 lb (91.6 kg)   SpO2 95%   BMI 29.83 kg/m²     Gen: Well, NAD, Alert, Oriented x 3   HEENT: EOMI, eyes clear, MMM, left eye has improved, gaze not deviating, eye not swollen  Skin: SKs on back   Neck: no significant lymphadenopathy or thyromegaly  Lungs: CTA B w/out Rales/Wheezes/Rhonchi, Good respiratory effort   Heart: RRR, S1S2, w/out M/R/G, non-displaced PMI   Ext: No C/C/E Bilaterally. Neuro: left eyelid droop, ataxic gait. Lateral deviation left eye   Psych: euthymic         Lab Results   Component Value Date    WBC 6.9 09/04/2022    HGB 13.7 (L) 09/04/2022    HCT 40.5 (L) 09/04/2022     09/04/2022    CHOL 135 01/10/2022    TRIG 81 01/10/2022    HDL 33 (L) 01/10/2022    ALT 16 09/04/2022    AST 20 09/04/2022     09/04/2022    K 4.0 09/04/2022     09/04/2022    CREATININE 1.03 09/04/2022    BUN 9 09/04/2022    CO2 25 09/04/2022    TSH 2.650 12/13/2020    PSA 1.52 12/13/2020    INR 1.0 12/13/2021    LABA1C 6.0 (H) 09/22/2022    LABMICR 4.10 (H) 11/15/2021         A&P   Diagnosis Orders   1. Vertebral artery occlusion, right  AFL - Demarcus Jewell MD, Vascular Surgery, [place]    CTA NECK W WO CONTRAST      2. Cerebrovascular accident (CVA), unspecified mechanism ([address]        3. Essential hypertension        4. Type 2 diabetes mellitus with hyperglycemia, without long-term current use of insulin (HCC)        5.  Cerebrovascular accident (CVA) due to thrombosis of precerebral artery ([address]               See pt instructions    Continue aspirin and plavix    Referral to vascular surgery     CTA neck     Wt Readings from Last 3 Encounters:   10/27/22 202 lb (91.6 kg)   09/22/22 285 lb (129.3 kg)   09/12/22 209 lb (94.8 kg)             Karin Soto MD

## 2022-10-27 NOTE — Clinical Note
Patient's wife stated today that communication of patient's results has been bad lately, even though it is documented that they were notified. Also she was upset that patient's weight had been entered incorrectly on 2 previous visits.   I told her I would bring it to your attention

## 2022-10-31 ENCOUNTER — TELEPHONE (OUTPATIENT)
Dept: FAMILY MEDICINE CLINIC | Age: 67
End: 2022-10-31

## 2022-10-31 NOTE — TELEPHONE ENCOUNTER
Pt's wife is calling stating that he was given a referral to Dr. Renetta Meyer for surgery. Pt's wife states she spoke with his office and he wouldn't be able to get in until next Tuesday 11/8/22. Pt's wife is wondering if they should just wait for that date or is there someone else they should go to?     Pt's wife- [phone]

## 2022-11-02 ENCOUNTER — HOSPITAL ENCOUNTER (OUTPATIENT)
Dept: CT IMAGING | Age: 67
Discharge: HOME | End: 2022-11-04
Payer: MEDICARE

## 2022-11-02 DIAGNOSIS — I65.01 VERTEBRAL ARTERY OCCLUSION, RIGHT: ICD-10-CM

## 2022-11-02 LAB
GFR SERPL CREATININE-BSD FRML MDRD: >60 ML/MIN/{1.73_M2}
PERFORMED ON: NORMAL
POC CREATININE: 1.1 MG/DL (ref 0.8–1.3)
POC SAMPLE TYPE: NORMAL

## 2022-11-02 PROCEDURE — 6360000004 HC RX CONTRAST MEDICATION: Performed by: FAMILY MEDICINE

## 2022-11-02 PROCEDURE — 70498 CT ANGIOGRAPHY NECK: CPT

## 2022-11-02 RX ADMIN — IOPAMIDOL 100 ML: 612 INJECTION, SOLUTION INTRAVENOUS at 15:56

## 2022-11-30 ENCOUNTER — HOSPITAL ENCOUNTER (EMERGENCY)
Age: 67
Discharge: HOME | End: 2022-11-30
Payer: MEDICARE

## 2022-11-30 ENCOUNTER — APPOINTMENT (OUTPATIENT)
Dept: GENERAL RADIOLOGY | Age: 67
End: 2022-11-30
Payer: MEDICARE

## 2022-11-30 VITALS
OXYGEN SATURATION: 96 % | BODY MASS INDEX: 29.62 KG/M2 | DIASTOLIC BLOOD PRESSURE: 88 MMHG | WEIGHT: 200 LBS | TEMPERATURE: 98.3 F | SYSTOLIC BLOOD PRESSURE: 151 MMHG | HEART RATE: 68 BPM | HEIGHT: 69 IN | RESPIRATION RATE: 20 BRPM

## 2022-11-30 DIAGNOSIS — M79.602 LEFT ARM PAIN: Primary | ICD-10-CM

## 2022-11-30 PROCEDURE — 73060 X-RAY EXAM OF HUMERUS: CPT

## 2022-11-30 PROCEDURE — 99283 EMERGENCY DEPT VISIT LOW MDM: CPT

## 2022-11-30 RX ORDER — TRAMADOL HYDROCHLORIDE 50 MG/1
50 TABLET ORAL EVERY 4 HOURS PRN
Qty: 18 TABLET | Refills: 0 | Status: SHIPPED | OUTPATIENT
Start: 2022-11-30 | End: 2022-12-03

## 2022-11-30 ASSESSMENT — PAIN - FUNCTIONAL ASSESSMENT
PAIN_FUNCTIONAL_ASSESSMENT: 0-10
PAIN_FUNCTIONAL_ASSESSMENT: NONE - DENIES PAIN

## 2022-11-30 ASSESSMENT — PAIN DESCRIPTION - ORIENTATION: ORIENTATION: LEFT;UPPER

## 2022-11-30 ASSESSMENT — ENCOUNTER SYMPTOMS
DIARRHEA: 0
NAUSEA: 0
CHEST TIGHTNESS: 0
SHORTNESS OF BREATH: 0
BACK PAIN: 0
SORE THROAT: 0
COLOR CHANGE: 1
EYE PAIN: 0

## 2022-11-30 ASSESSMENT — PAIN DESCRIPTION - PAIN TYPE: TYPE: ACUTE PAIN

## 2022-11-30 ASSESSMENT — PAIN DESCRIPTION - FREQUENCY: FREQUENCY: CONTINUOUS

## 2022-11-30 ASSESSMENT — PAIN DESCRIPTION - DESCRIPTORS: DESCRIPTORS: ACHING

## 2022-11-30 ASSESSMENT — PAIN SCALES - GENERAL: PAINLEVEL_OUTOF10: 6

## 2022-11-30 ASSESSMENT — PAIN DESCRIPTION - LOCATION: LOCATION: ARM

## 2022-12-01 DIAGNOSIS — E11.65 TYPE 2 DIABETES MELLITUS WITH HYPERGLYCEMIA, WITHOUT LONG-TERM CURRENT USE OF INSULIN (HCC): ICD-10-CM

## 2022-12-01 RX ORDER — PIOGLITAZONEHYDROCHLORIDE 30 MG/1
30 TABLET ORAL DAILY
Qty: 30 TABLET | Refills: 5 | Status: SHIPPED | OUTPATIENT
Start: 2022-12-01

## 2022-12-01 NOTE — TELEPHONE ENCOUNTER
Last Office Visit (last PCP visit):   10/27/2022    Next Visit Date:  Future Appointments   Date Time Provider Peter Cleo   2/13/2023  4:15 PM Haja Cuevas MD [facility] Neurology -       **If hasn't been seen in over a year OR hasn't followed up according to last diabetes/ADHD visit, make appointment for patient before sending refill to provider.     Rx requested:  Requested Prescriptions     Pending Prescriptions Disp Refills    pioglitazone (ACTOS) 30 MG tablet [Pharmacy Med Name: pioglitazone 30 mg tablet] 30 tablet 5     Sig: Take 1 tablet by mouth daily

## 2022-12-01 NOTE — ED TRIAGE NOTES
Pt to ed from home via triage with c/o left upper arm pain  Pt reports onset of pain when he inadvertently hit a cabinet  Large hematoma noted to LUE, wife concerned due to Plavix use and hx of CVA  On arrival pt skin WDI, respirations even and unlabored   Pt calm and cooperative, alert and oriented. No s/s of acute distress noted.

## 2022-12-01 NOTE — ED PROVIDER NOTES
[facility] ED  eMERGENCYdEPARTMENT eNCOUnter      Pt Name: Arlene Nieto  MRN: [id number]  [place]ind 1955  Date of evaluation: 11/30/2022  Provider:Ivan Montes PA-C    CHIEF COMPLAINT           HPI  Arlene Nieto is a 78 y.o. male presenting with left tricep pain. Patient reports sudden onset, severe, sharp, numbing pain to the left tricep which occurred when he swung his arm back hitting it on the corner of a cabinet earlier today. Patient denies numbness, tingling, fever, chills. Worried because he is on aspirin and Plavix. ROS  Review of Systems   Constitutional:  Negative for chills, fatigue and fever. HENT:  Negative for ear pain, hearing loss and sore throat. Eyes:  Negative for pain and visual disturbance. Respiratory:  Negative for chest tightness and shortness of breath. Cardiovascular:  Negative for chest pain. Gastrointestinal:  Negative for diarrhea and nausea. Endocrine: Negative for cold intolerance. Genitourinary:  Negative for hematuria. Musculoskeletal:  Negative for back pain. Skin:  Positive for color change. Negative for rash and wound. Neurological:  Negative for dizziness and headaches. Psychiatric/Behavioral:  Negative for behavioral problems and confusion. Except as noted above the remainder of the review of systems was reviewed and negative. PAST MEDICAL HISTORY     Past Medical History:   Diagnosis Date    Allergic rhinitis     hayfever    Hypertension     Pilonidal cyst     Stroke [facility])     Tobacco use disorder          SURGICAL HISTORY     No past surgical history on file.       CURRENTMEDICATIONS       Previous Medications    ASPIRIN 81 MG EC TABLET    Take 81 mg by mouth daily    CHLORHEXIDINE (PERIDEX) 0.12 % SOLUTION    SWISH & SPIT 1 TABLESPOONFUL (15 ML) BY MOUTH FOR 30 SECONDS TWICE DAILY (AFTER BRUSHING)    CLOPIDOGREL (PLAVIX) 75 MG TABLET    Take 1 tablet by mouth daily    DONEPEZIL (ARICEPT) 10 MG TABLET Take 1 tablet by mouth daily q  am    LOSARTAN (COZAAR) 100 MG TABLET    Take 1 tablet by mouth daily    MEMANTINE (NAMENDA) 5 MG TABLET    TAKE 1 TABLET TWICE DAILY    METFORMIN (GLUCOPHAGE) 1000 MG TABLET    TAKE 1 TABLET BY MOUTH TWICE DAILY WITH MEALS    PIOGLITAZONE (ACTOS) 30 MG TABLET    Take 1 tablet by mouth daily    PRAVASTATIN (PRAVACHOL) 40 MG TABLET    Take 1 tablet by mouth daily       ALLERGIES     Sulfa antibiotics    FAMILY HISTORY       Family History   Problem Relation Age of Onset    Stroke Mother     High Blood Pressure Mother     High Blood Pressure Father     Stroke Maternal Grandmother     Stroke Maternal Grandfather     Liver Cancer Maternal Uncle           SOCIAL HISTORY       Social History     Socioeconomic History    Marital status:    Tobacco Use    Smoking status: Former     Packs/day: 0.50     Years: 37.00     Pack years: 18.50     Types: Cigarettes     Start date: 1976     Quit date: 2013     Years since quittin.0    Smokeless tobacco: Never   Substance and Sexual Activity    Alcohol use: Yes     Comment: social - only small amount on Friday, sometimes heavier on Saturday     Social Determinants of Health     Financial Resource Strain: Low Risk     Difficulty of Paying Living Expenses: Not hard at all   Food Insecurity: No Food Insecurity    Worried About Running Out of Food in the Last Year: Never true    Ran Out of Food in the Last Year: Never true   Transportation Needs: No Transportation Needs    Lack of Transportation (Medical): No    Lack of Transportation (Non-Medical): No         PHYSICAL EXAM       ED Triage Vitals [22]   BP Temp Temp Source Heart Rate Resp SpO2 Height Weight   (!) 151/88 98.3 °F (36.8 °C) Oral 68 20 96 % 5' 9\" (1.753 m) 200 lb (90.7 kg)       Physical Exam  Constitutional:       Appearance: Normal appearance. HENT:      Head: Normocephalic and atraumatic.       Nose: Nose normal.      Mouth/Throat:      Mouth: Mucous membranes are moist.      Pharynx: No oropharyngeal exudate or posterior oropharyngeal erythema. Eyes:      Extraocular Movements: Extraocular movements intact. Conjunctiva/sclera: Conjunctivae normal.      Pupils: Pupils are equal, round, and reactive to light. Cardiovascular:      Rate and Rhythm: Normal rate and regular rhythm. Heart sounds: Normal heart sounds. Pulmonary:      Effort: Pulmonary effort is normal.      Breath sounds: Normal breath sounds. No wheezing or rhonchi. Abdominal:      General: Bowel sounds are normal.      Palpations: Abdomen is soft. Tenderness: There is no abdominal tenderness. There is no guarding. Musculoskeletal:         General: No deformity. Normal range of motion. Cervical back: Normal range of motion and neck supple. Skin:     General: Skin is warm and dry. Coloration: Skin is not jaundiced. Neurological:      General: No focal deficit present. Mental Status: He is alert and oriented to person, place, and time. Psychiatric:         Mood and Affect: Mood normal.         Behavior: Behavior normal.         MDM  This is a 50-year-old male presenting with arm pain. Patient afebrile and stable. X-ray negative for acute fractures. Likely hematoma with possible muscular injury the knees. Patient agreeable to discharge with RICE protocols and Tylenol as needed for pain. He will follow-up with his primary care doctor and return if symptoms change or worsen. FINAL IMPRESSION      1.  Left arm pain          DISPOSITION/PLAN   DISPOSITION Decision To Discharge 11/30/2022 09:28:24 PM        DISCHARGE MEDICATIONS:  [unfilled]         Marlon Chaudhari PA-C(electronically signed)  Attending Emergency Physician           Marlon Chaudhari PA-C  11/30/22 2121

## 2022-12-28 DIAGNOSIS — E11.65 TYPE 2 DIABETES MELLITUS WITH HYPERGLYCEMIA, WITHOUT LONG-TERM CURRENT USE OF INSULIN (HCC): ICD-10-CM

## 2022-12-28 RX ORDER — MEMANTINE HYDROCHLORIDE 5 MG/1
TABLET ORAL
Qty: 60 TABLET | Refills: 5 | Status: SHIPPED | OUTPATIENT
Start: 2022-12-28

## 2022-12-28 RX ORDER — PIOGLITAZONEHYDROCHLORIDE 30 MG/1
30 TABLET ORAL DAILY
Qty: 30 TABLET | Refills: 5 | Status: SHIPPED | OUTPATIENT
Start: 2022-12-28

## 2022-12-28 NOTE — TELEPHONE ENCOUNTER
Comments: pt wife said  is good on appt for now. Said he spent most of his time this year at doctors offices. Last Office Visit (last PCP visit):   10/27/2022    Next Visit Date:  Future Appointments   Date Time Provider Peter Scottisti   2/13/2023  4:15 PM Haja Rainey MD  Neurology -       **If hasn't been seen in over a year OR hasn't followed up according to last diabetes/ADHD visit, make appointment for patient before sending refill to provider.     Rx requested:  Requested Prescriptions     Pending Prescriptions Disp Refills    memantine (NAMENDA) 5 MG tablet 60 tablet 5     Sig: TAKE 1 TABLET TWICE DAILY    pioglitazone (ACTOS) 30 MG tablet 30 tablet 5     Sig: Take 1 tablet by mouth daily

## 2023-01-05 ENCOUNTER — OFFICE VISIT (OUTPATIENT)
Dept: FAMILY MEDICINE CLINIC | Age: 68
End: 2023-01-05

## 2023-01-05 VITALS
WEIGHT: 203 LBS | DIASTOLIC BLOOD PRESSURE: 80 MMHG | SYSTOLIC BLOOD PRESSURE: 126 MMHG | HEIGHT: 69 IN | HEART RATE: 62 BPM | OXYGEN SATURATION: 95 % | BODY MASS INDEX: 30.07 KG/M2 | TEMPERATURE: 97.8 F

## 2023-01-05 DIAGNOSIS — R20.2 PARESTHESIA: Primary | ICD-10-CM

## 2023-01-05 DIAGNOSIS — F01.50 VASCULAR DEMENTIA WITHOUT BEHAVIORAL DISTURBANCE (HCC): ICD-10-CM

## 2023-01-05 DIAGNOSIS — E11.65 TYPE 2 DIABETES MELLITUS WITH HYPERGLYCEMIA, WITHOUT LONG-TERM CURRENT USE OF INSULIN (HCC): ICD-10-CM

## 2023-01-05 SDOH — ECONOMIC STABILITY: FOOD INSECURITY: WITHIN THE PAST 12 MONTHS, THE FOOD YOU BOUGHT JUST DIDN'T LAST AND YOU DIDN'T HAVE MONEY TO GET MORE.: NEVER TRUE

## 2023-01-05 SDOH — ECONOMIC STABILITY: FOOD INSECURITY: WITHIN THE PAST 12 MONTHS, YOU WORRIED THAT YOUR FOOD WOULD RUN OUT BEFORE YOU GOT MONEY TO BUY MORE.: NEVER TRUE

## 2023-01-05 ASSESSMENT — PATIENT HEALTH QUESTIONNAIRE - PHQ9
SUM OF ALL RESPONSES TO PHQ QUESTIONS 1-9: 0
1. LITTLE INTEREST OR PLEASURE IN DOING THINGS: 0
SUM OF ALL RESPONSES TO PHQ QUESTIONS 1-9: 0
SUM OF ALL RESPONSES TO PHQ9 QUESTIONS 1 & 2: 0
2. FEELING DOWN, DEPRESSED OR HOPELESS: 0
SUM OF ALL RESPONSES TO PHQ QUESTIONS 1-9: 0
SUM OF ALL RESPONSES TO PHQ QUESTIONS 1-9: 0

## 2023-01-05 ASSESSMENT — SOCIAL DETERMINANTS OF HEALTH (SDOH): HOW HARD IS IT FOR YOU TO PAY FOR THE VERY BASICS LIKE FOOD, HOUSING, MEDICAL CARE, AND HEATING?: NOT HARD AT ALL

## 2023-01-05 NOTE — PROGRESS NOTES
Chief Complaint   Patient presents with    Arm Pain     Left arm, inured in October, swelling and bruise gone but hitting arm, possibly going numb    Health Maintenance     Declined colon        HPI:  Angelina Cohen is a 78 y.o. male    Here with wife    Hx CVA, HTN, dyslipidemia, diabetes    Left arm hematoma , went to ER  Residual numbness initermittently in left forearm     Recent MRA brain showed the results below   1. High-grade stenosis in the distal right vertebral artery. Further   evaluation with CTA of the neck or pre and post contrast enhanced MRA of the   neck is recommended. 2. Moderate stenoses in the P2 segment of the right posterior cerebral artery. 3. Mild-to-moderate stenosis in the proximal left cavernous ICA. 4. The MRA of the brain from 2013 was normal.     Eye deviation and eyelid have improved         Lab Results   Component Value Date    LABA1C 6.0 (H) 2022     No results found for: EAG      Social History     Socioeconomic History    Marital status:      Spouse name: None    Number of children: None    Years of education: None    Highest education level: None   Tobacco Use    Smoking status: Former     Packs/day: 0.50     Years: 37.00     Pack years: 18.50     Types: Cigarettes     Start date: 1976     Quit date: 2013     Years since quittin.1    Smokeless tobacco: Never   Substance and Sexual Activity    Alcohol use: Yes     Comment: social - only small amount on Friday, sometimes heavier on Saturday     Social Determinants of Health     Financial Resource Strain: Low Risk     Difficulty of Paying Living Expenses: Not hard at all   Food Insecurity: No Food Insecurity    Worried About Running Out of Food in the Last Year: Never true    Ran Out of Food in the Last Year: Never true   Transportation Needs: No Transportation Needs    Lack of Transportation (Medical): No    Lack of Transportation (Non-Medical):  No             Wt Readings from Last 3 Encounters:   01/05/23 203 lb (92.1 kg)   11/30/22 200 lb (90.7 kg)   10/27/22 202 lb (91.6 kg)       Patient Active Problem List   Diagnosis    Allergic rhinitis    Hypertension    Small vessel cerebrovascular accident (CVA) ([facility] [place] [address]    Pilonidal cyst    Type 2 diabetes mellitus with hyperglycemia, without long-term current use of insulin (HCC)    Vascular dementia without behavioral disturbance (HCC)    Memory loss       Current Outpatient Medications   Medication Sig Dispense Refill    memantine (NAMENDA) 5 MG tablet TAKE 1 TABLET TWICE DAILY 60 tablet 5    pioglitazone (ACTOS) 30 MG tablet Take 1 tablet by mouth daily 30 tablet 5    clopidogrel (PLAVIX) 75 MG tablet Take 1 tablet by mouth daily 30 tablet 5    donepezil (ARICEPT) 10 MG tablet Take 1 tablet by mouth daily q  am 30 tablet 5    losartan (COZAAR) 100 MG tablet Take 1 tablet by mouth daily 30 tablet 5    metFORMIN (GLUCOPHAGE) 1000 MG tablet TAKE 1 TABLET BY MOUTH TWICE DAILY WITH MEALS 60 tablet 5    pravastatin (PRAVACHOL) 40 MG tablet Take 1 tablet by mouth daily 30 tablet 5    chlorhexidine (PERIDEX) 0.12 % solution SWISH & SPIT 1 TABLESPOONFUL (15 ML) BY MOUTH FOR 30 SECONDS TWICE DAILY (AFTER BRUSHING)      aspirin 81 MG EC tablet Take 81 mg by mouth daily       No current facility-administered medications for this visit. Patient's medications, allergies, past medical, surgical, social and family histories were reviewed and updated as appropriate.     Review of Systems:   General ROS: negative for - chills, fatigue, fever, malaise, weight gain or weight loss  Respiratory ROS: no cough, shortness of breath, or wheezing  Cardiovascular ROS: no chest pain or dyspnea on exertion  Gastrointestinal ROS: no abdominal pain, change in bowel habits, or black or bloody stools  Genito-Urinary ROS: no dysuria, trouble voiding  Musculoskeletal ROS: negative for - gait disturbance, joint pain or joint stiffness  Neurological ROS: normal speech today, vertigo In general patient otherwise reports feeling well. Physical Exam:  /80 (Site: Left Upper Arm)   Pulse 62   Temp 97.8 °F (36.6 °C)   Ht 5' 9\" (1.753 m)   Wt 203 lb (92.1 kg)   SpO2 95%   BMI 29.98 kg/m²     Gen: Well, NAD, Alert, Oriented x 3   HEENT: EOMI, eyes clear, MMM, left eye has improved, gaze not deviating, eye not swollen  Skin: SKs on back   Neck: no significant lymphadenopathy or thyromegaly  Lungs: CTA B w/out Rales/Wheezes/Rhonchi, Good respiratory effort   Heart: RRR, S1S2, w/out M/R/G, non-displaced PMI   Ext: No C/C/E Bilaterally. Neuro: currently nonfocal  Psych: euthymic         Lab Results   Component Value Date    WBC 6.9 09/04/2022    HGB 13.7 (L) 09/04/2022    HCT 40.5 (L) 09/04/2022     09/04/2022    CHOL 135 01/10/2022    TRIG 81 01/10/2022    HDL 33 (L) 01/10/2022    ALT 16 09/04/2022    AST 20 09/04/2022     09/04/2022    K 4.0 09/04/2022     09/04/2022    CREATININE 1.1 11/02/2022    BUN 9 09/04/2022    CO2 25 09/04/2022    TSH 2.650 12/13/2020    PSA 1.52 12/13/2020    INR 1.0 12/13/2021    LABA1C 6.0 (H) 09/22/2022    LABMICR 4.10 (H) 11/15/2021         A&P   Diagnosis Orders   1. Paresthesia        2. Vascular dementia without behavioral disturbance ([facility] [address]        3.  Type 2 diabetes mellitus with hyperglycemia, without long-term current use of insulin ([facility])               Reassurance, observation    Wt Readings from Last 3 Encounters:   01/05/23 203 lb (92.1 kg)   11/30/22 200 lb (90.7 kg)   10/27/22 202 lb (91.6 kg)             Stefani García MD

## 2023-02-13 ENCOUNTER — OFFICE VISIT (OUTPATIENT)
Dept: NEUROLOGY | Age: 68
End: 2023-02-13
Payer: MEDICARE

## 2023-02-13 VITALS
HEART RATE: 73 BPM | DIASTOLIC BLOOD PRESSURE: 76 MMHG | BODY MASS INDEX: 29.92 KG/M2 | SYSTOLIC BLOOD PRESSURE: 120 MMHG | WEIGHT: 202.6 LBS

## 2023-02-13 DIAGNOSIS — S09.90XA CLOSED HEAD INJURY, INITIAL ENCOUNTER: ICD-10-CM

## 2023-02-13 DIAGNOSIS — R42 VERTIGO: ICD-10-CM

## 2023-02-13 DIAGNOSIS — R41.3 MEMORY LOSS: ICD-10-CM

## 2023-02-13 DIAGNOSIS — I63.9 SMALL VESSEL CEREBROVASCULAR ACCIDENT (CVA) (HCC): ICD-10-CM

## 2023-02-13 DIAGNOSIS — G45.0 VBI (VERTEBROBASILAR INSUFFICIENCY): ICD-10-CM

## 2023-02-13 DIAGNOSIS — F01.50 VASCULAR DEMENTIA WITHOUT BEHAVIORAL DISTURBANCE (HCC): Primary | ICD-10-CM

## 2023-02-13 PROCEDURE — G8484 FLU IMMUNIZE NO ADMIN: HCPCS | Performed by: PSYCHIATRY & NEUROLOGY

## 2023-02-13 PROCEDURE — G8427 DOCREV CUR MEDS BY ELIG CLIN: HCPCS | Performed by: PSYCHIATRY & NEUROLOGY

## 2023-02-13 PROCEDURE — 3017F COLORECTAL CA SCREEN DOC REV: CPT | Performed by: PSYCHIATRY & NEUROLOGY

## 2023-02-13 PROCEDURE — G8417 CALC BMI ABV UP PARAM F/U: HCPCS | Performed by: PSYCHIATRY & NEUROLOGY

## 2023-02-13 PROCEDURE — 99214 OFFICE O/P EST MOD 30 MIN: CPT | Performed by: PSYCHIATRY & NEUROLOGY

## 2023-02-13 PROCEDURE — 1123F ACP DISCUSS/DSCN MKR DOCD: CPT | Performed by: PSYCHIATRY & NEUROLOGY

## 2023-02-13 PROCEDURE — 3074F SYST BP LT 130 MM HG: CPT | Performed by: PSYCHIATRY & NEUROLOGY

## 2023-02-13 PROCEDURE — 1036F TOBACCO NON-USER: CPT | Performed by: PSYCHIATRY & NEUROLOGY

## 2023-02-13 PROCEDURE — 3078F DIAST BP <80 MM HG: CPT | Performed by: PSYCHIATRY & NEUROLOGY

## 2023-02-13 ASSESSMENT — ENCOUNTER SYMPTOMS
NAUSEA: 0
SHORTNESS OF BREATH: 0
BACK PAIN: 0
CHOKING: 0
TROUBLE SWALLOWING: 0
VOMITING: 0
COLOR CHANGE: 0
PHOTOPHOBIA: 0

## 2023-02-13 NOTE — PROGRESS NOTES
Subjective:      Patient ID: Avelina Tucker is a 78 y.o. male who presents today for:  Chief Complaint   Patient presents with    Follow-up     Pt had a few issues with vertigo last summer, pt is now better. Pt has had a few falls and hit his head, pt has gone to er for them they haven't found anything wrong with his brain. Pts memory is no better no worse, more short term memory issues. No behavior issues. HPI 36-year-old right-handed gentleman now with a history of vascular dementia without behavioral disturbance. Patient's most ischemic changes and memory loss. .  Patient is on Aricept. She has not been seen here since 2021. Prior to this he was seen in 2013 and we have continue to follow his risk factors for cerebrovascular disease. Patient was on dual antiplatelet therapy  And still continues to be on this. Is now on a combination of Namenda and Aricept. Summary had some issues with vertigo is better. He had a few falls and hit his head but he has not changed much    Patient's chart review was done as patient has had multiple urinations in the recent past.  MRI of the brain which was done in September. Patient has multiple lacunar infarcts and chronic microvascular changes which explains his vascular issues. He does fluctuate. We diagnosed him 10 years ago and he has not quite significantly worsened and therefore this is a vascular dementia. He of the neck was reviewed and shows a distal left vertebral artery occlusion. There is no other significant stenosis. He has some multiple other small areas of stenosis he had a bout of vertigo which is improved he had a couple of head injuries due to falls but no loss of consciousness  Past Medical History:   Diagnosis Date    Allergic rhinitis     hayfever    Hypertension     Pilonidal cyst     Stroke [facility])     Tobacco use disorder      No past surgical history on file.   Social History     Socioeconomic History    Marital status:  Spouse name: Not on file    Number of children: Not on file    Years of education: Not on file    Highest education level: Not on file   Occupational History    Not on file   Tobacco Use    Smoking status: Former     Packs/day: 0.50     Years: 37.00     Pack years: 18.50     Types: Cigarettes     Start date: 1976     Quit date: 2013     Years since quittin.2    Smokeless tobacco: Never   Substance and Sexual Activity    Alcohol use: Yes     Comment: social - only small amount on Friday, sometimes heavier on Saturday    Drug use: Not on file    Sexual activity: Not on file   Other Topics Concern    Not on file   Social History Narrative    Not on file     Social Determinants of Health     Financial Resource Strain: Low Risk     Difficulty of Paying Living Expenses: Not hard at all   Food Insecurity: No Food Insecurity    Worried About [address] in the Last Year: Never true    [address]  [employer] in the Last Year: Never true   Transportation Needs: No Transportation Needs    Lack of Transportation (Medical): No    Lack of Transportation (Non-Medical):  No   Physical Activity: Not on file   Stress: Not on file   Social Connections: Not on file   Intimate Partner Violence: Not on file   Housing Stability: Not on file     Family History   Problem Relation Age of Onset    Stroke Mother     High Blood Pressure Mother     High Blood Pressure Father     Stroke Maternal Grandmother     Stroke Maternal Grandfather     Liver Cancer Maternal Uncle      Allergies   Allergen Reactions    Shrimp (Diagnostic)     Sulfa Antibiotics        Current Outpatient Medications   Medication Sig Dispense Refill    memantine (NAMENDA) 5 MG tablet TAKE 1 TABLET TWICE DAILY 60 tablet 5    pioglitazone (ACTOS) 30 MG tablet Take 1 tablet by mouth daily 30 tablet 5    clopidogrel (PLAVIX) 75 MG tablet Take 1 tablet by mouth daily 30 tablet 5    donepezil (ARICEPT) 10 MG tablet Take 1 tablet by mouth daily q  am 30 tablet 5 losartan (COZAAR) 100 MG tablet Take 1 tablet by mouth daily 30 tablet 5    metFORMIN (GLUCOPHAGE) 1000 MG tablet TAKE 1 TABLET BY MOUTH TWICE DAILY WITH MEALS 60 tablet 5    pravastatin (PRAVACHOL) 40 MG tablet Take 1 tablet by mouth daily 30 tablet 5    chlorhexidine (PERIDEX) 0.12 % solution SWISH & SPIT 1 TABLESPOONFUL (15 ML) BY MOUTH FOR 30 SECONDS TWICE DAILY (AFTER BRUSHING)      aspirin 81 MG EC tablet Take 81 mg by mouth daily       No current facility-administered medications for this visit. Review of Systems   Constitutional:  Negative for fever. HENT:  Negative for ear pain, tinnitus and trouble swallowing. Eyes:  Negative for photophobia and visual disturbance. Respiratory:  Negative for choking and shortness of breath. Cardiovascular:  Negative for chest pain and palpitations. Gastrointestinal:  Negative for nausea and vomiting. Musculoskeletal:  Negative for back pain, gait problem, joint swelling, myalgias, neck pain and neck stiffness. Skin:  Negative for color change. Allergic/Immunologic: Negative for food allergies. Neurological:  Positive for weakness. Negative for dizziness, tremors, seizures, syncope, facial asymmetry, speech difficulty, light-headedness, numbness and headaches. Psychiatric/Behavioral:  Negative for behavioral problems, confusion, hallucinations and sleep disturbance. Objective:   /76 (Site: Right Upper Arm, Position: Sitting, Cuff Size: Medium Adult)   Pulse 73   Wt 202 lb 9.6 oz (91.9 kg)   BMI 29.92 kg/m²     Physical Exam  Vitals reviewed. Eyes:      Pupils: Pupils are equal, round, and reactive to light. Cardiovascular:      Rate and Rhythm: Normal rate and regular rhythm. Heart sounds: No murmur heard. Pulmonary:      Effort: Pulmonary effort is normal.      Breath sounds: Normal breath sounds. Abdominal:      General: Bowel sounds are normal.   Musculoskeletal:         General: Normal range of motion. Cervical back: Normal range of motion. Skin:     General: Skin is warm. Neurological:      Mental Status: He is alert. He is disoriented. Cranial Nerves: No cranial nerve deficit. Sensory: No sensory deficit. Motor: No abnormal muscle tone. Coordination: Coordination normal.      Deep Tendon Reflexes: Reflexes are normal and symmetric. Babinski sign absent on the right side. Babinski sign absent on the left side. Psychiatric:         Mood and Affect: Mood normal.       XR HUMERUS LEFT (MIN 2 VIEWS)    Result Date: 11/30/2022  EXAMINATION: TWO XRAY VIEWS OF THE LEFT HUMERUS 11/30/2022 8:48 pm COMPARISON: None. HISTORY: ORDERING SYSTEM PROVIDED HISTORY: injury TECHNOLOGIST PROVIDED HISTORY: Reason for exam:->injury What reading provider will be dictating this exam?->CRC FINDINGS: There is no evidence of acute fracture. There is normal alignment. No acute joint abnormality. No focal osseous lesion. No focal soft tissue abnormality. No acute osseous abnormality.        Lab Results   Component Value Date/Time    WBC 6.9 09/04/2022 11:15 AM    RBC 4.36 09/04/2022 11:15 AM    HGB 13.7 09/04/2022 11:15 AM    HCT 40.5 09/04/2022 11:15 AM    MCV 92.9 09/04/2022 11:15 AM    MCH 31.4 09/04/2022 11:15 AM    MCHC 33.8 09/04/2022 11:15 AM    RDW 13.9 09/04/2022 11:15 AM     09/04/2022 11:15 AM    MPV 8.9 11/19/2013 06:49 AM     Lab Results   Component Value Date/Time     09/04/2022 11:15 AM    K 4.0 09/04/2022 11:15 AM     09/04/2022 11:15 AM    CO2 25 09/04/2022 11:15 AM    BUN 9 09/04/2022 11:15 AM    CREATININE 1.1 11/02/2022 03:44 PM    CREATININE 1.03 09/04/2022 11:15 AM    GFRAA >60.0 09/04/2022 11:15 AM    LABGLOM >60 11/02/2022 03:44 PM    GLUCOSE 175 09/04/2022 11:15 AM    PROT 6.1 09/04/2022 11:15 AM    LABALBU 3.9 09/04/2022 11:15 AM    CALCIUM 9.3 09/04/2022 11:15 AM    BILITOT 0.4 09/04/2022 11:15 AM    ALKPHOS 60 09/04/2022 11:15 AM    AST 20 09/04/2022 11:15 AM ALT 16 09/04/2022 11:15 AM     Lab Results   Component Value Date/Time    PROTIME 13.1 12/13/2021 10:30 PM    INR 1.0 12/13/2021 10:30 PM     Lab Results   Component Value Date/Time    TSH 2.650 12/13/2020 01:17 PM     Lab Results   Component Value Date/Time    TRIG 81 01/10/2022 09:53 AM    HDL 33 01/10/2022 09:53 AM    LDLCALC 86 01/10/2022 09:53 AM     Lab Results   Component Value Date/Time    LABAMPH Negative 11/17/2013 02:49 PM    BARBSCNU Negative 11/17/2013 02:49 PM    LABBENZ Negative 11/17/2013 02:49 PM    OPIATESCREENURINE Negative 11/17/2013 02:49 PM    PHENCYCLIDINESCREENURINE Negative 11/17/2013 02:49 PM    ETOH <10 12/13/2021 10:30 PM     No results found for: LITHIUM, DILFRTOT, VALPROATE    Assessment:       Diagnosis Orders   1. Vascular dementia without behavioral disturbance ([address]        2. Small vessel cerebrovascular accident (CVA) ([address]        3. Memory loss        4. Vertigo        5. Closed head injury, initial encounter        6. VBI (vertebrobasilar insufficiency)        Vascular dementia which had diagnosed many years ago. He initially presented in 2013 with a stroke and then since then he had a cognitive decline. He was initially on Aricept and Namenda has been added. We have not seen him since 2021 and he has not quite declined but has fluctuated. This explains his findings of vascular dementia. Recent MRI done shows chronic white microvascular changes with some minor stenosis. He is already on Plavix for the same. Patient had bouts of vertigo and he had some injuries to the head as well but no major head injuries reported. He still is independent his activity of daily and functional.  I did discuss that this likely may turn out to be a vascular dementia as noted from the temporal profile of the disease process over the years in addition patient can develop degenerative dementia.   We will keep an eye on this and continue to follow  Recent investigation good MRI of the brain MRA CT angiograms were all reviewed. Patient does have stenosis of the vertebral artery on the right and may have underlying vertebrobasilar insufficiency. Haja Rome MD, Jay Orantes, American Board of Psychiatry & Neurology  Board Certified in Vascular Neurology  Board Certified in Neuromuscular Medicine  Certified in [facility]:      No orders of the defined types were placed in this encounter. No orders of the defined types were placed in this encounter. Return in about 1 year (around 2/13/2024).       Beth Pillai MD

## 2023-03-14 DIAGNOSIS — I63.9 CEREBROVASCULAR ACCIDENT (CVA), UNSPECIFIED MECHANISM (HCC): ICD-10-CM

## 2023-03-14 DIAGNOSIS — I10 ESSENTIAL HYPERTENSION: ICD-10-CM

## 2023-03-14 DIAGNOSIS — I63.00 CEREBROVASCULAR ACCIDENT (CVA) DUE TO THROMBOSIS OF PRECEREBRAL ARTERY (HCC): ICD-10-CM

## 2023-03-14 DIAGNOSIS — E11.65 TYPE 2 DIABETES MELLITUS WITH HYPERGLYCEMIA, WITHOUT LONG-TERM CURRENT USE OF INSULIN (HCC): ICD-10-CM

## 2023-03-14 RX ORDER — CLOPIDOGREL BISULFATE 75 MG/1
75 TABLET ORAL DAILY
Qty: 30 TABLET | Refills: 5 | Status: SHIPPED | OUTPATIENT
Start: 2023-03-14

## 2023-03-14 RX ORDER — PIOGLITAZONEHYDROCHLORIDE 30 MG/1
30 TABLET ORAL DAILY
Qty: 30 TABLET | Refills: 5 | Status: SHIPPED | OUTPATIENT
Start: 2023-03-14

## 2023-03-14 RX ORDER — PRAVASTATIN SODIUM 40 MG
40 TABLET ORAL DAILY
Qty: 30 TABLET | Refills: 5 | Status: SHIPPED | OUTPATIENT
Start: 2023-03-14

## 2023-03-14 RX ORDER — DONEPEZIL HYDROCHLORIDE 10 MG/1
10 TABLET, FILM COATED ORAL DAILY
Qty: 30 TABLET | Refills: 5 | Status: SHIPPED | OUTPATIENT
Start: 2023-03-14

## 2023-03-14 RX ORDER — LOSARTAN POTASSIUM 100 MG/1
100 TABLET ORAL DAILY
Qty: 30 TABLET | Refills: 5 | Status: SHIPPED | OUTPATIENT
Start: 2023-03-14

## 2023-03-14 RX ORDER — MEMANTINE HYDROCHLORIDE 5 MG/1
TABLET ORAL
Qty: 60 TABLET | Refills: 5 | Status: SHIPPED | OUTPATIENT
Start: 2023-03-14

## 2023-03-14 NOTE — TELEPHONE ENCOUNTER
Comments: pt completely out; declined appt     Last Office Visit (last PCP visit):   1/5/2023    Next Visit Date:  No future appointments. **If hasn't been seen in over a year OR hasn't followed up according to last diabetes/ADHD visit, make appointment for patient before sending refill to provider.     Rx requested:  Requested Prescriptions     Pending Prescriptions Disp Refills    pravastatin (PRAVACHOL) 40 MG tablet 30 tablet 5     Sig: Take 1 tablet by mouth daily    losartan (COZAAR) 100 MG tablet 30 tablet 5     Sig: Take 1 tablet by mouth daily    pioglitazone (ACTOS) 30 MG tablet 30 tablet 5     Sig: Take 1 tablet by mouth daily    clopidogrel (PLAVIX) 75 MG tablet 30 tablet 5     Sig: Take 1 tablet by mouth daily    memantine (NAMENDA) 5 MG tablet 60 tablet 5     Sig: TAKE 1 TABLET TWICE DAILY    metFORMIN (GLUCOPHAGE) 1000 MG tablet 60 tablet 5     Sig: TAKE 1 TABLET BY MOUTH TWICE DAILY WITH MEALS

## 2023-03-23 ENCOUNTER — TELEPHONE (OUTPATIENT)
Dept: FAMILY MEDICINE CLINIC | Age: 68
End: 2023-03-23

## 2023-03-24 ENCOUNTER — TELEPHONE (OUTPATIENT)
Dept: GASTROENTEROLOGY | Age: 68
End: 2023-03-24

## 2023-04-17 ENCOUNTER — TELEPHONE (OUTPATIENT)
Dept: FAMILY MEDICINE CLINIC | Age: 68
End: 2023-04-17

## 2023-04-27 ASSESSMENT — ENCOUNTER SYMPTOMS
VOICE CHANGE: 0
EYE DISCHARGE: 0
ABDOMINAL DISTENTION: 0
SHORTNESS OF BREATH: 0
ANAL BLEEDING: 0
NAUSEA: 0
PHOTOPHOBIA: 0
COUGH: 0
VOMITING: 0
APNEA: 0

## 2023-05-10 ENCOUNTER — OFFICE VISIT (OUTPATIENT)
Dept: FAMILY MEDICINE CLINIC | Age: 68
End: 2023-05-10

## 2023-05-10 VITALS
DIASTOLIC BLOOD PRESSURE: 70 MMHG | BODY MASS INDEX: 29.65 KG/M2 | TEMPERATURE: 96.8 F | HEART RATE: 64 BPM | WEIGHT: 200.2 LBS | HEIGHT: 69 IN | SYSTOLIC BLOOD PRESSURE: 116 MMHG | OXYGEN SATURATION: 98 %

## 2023-05-10 DIAGNOSIS — E11.65 TYPE 2 DIABETES MELLITUS WITH HYPERGLYCEMIA, WITHOUT LONG-TERM CURRENT USE OF INSULIN (HCC): ICD-10-CM

## 2023-05-10 DIAGNOSIS — Z12.5 SCREENING PSA (PROSTATE SPECIFIC ANTIGEN): ICD-10-CM

## 2023-05-10 DIAGNOSIS — I63.00 CEREBROVASCULAR ACCIDENT (CVA) DUE TO THROMBOSIS OF PRECEREBRAL ARTERY (HCC): ICD-10-CM

## 2023-05-10 DIAGNOSIS — F01.50 VASCULAR DEMENTIA WITHOUT BEHAVIORAL DISTURBANCE (HCC): ICD-10-CM

## 2023-05-10 DIAGNOSIS — R41.3 MEMORY LOSS: ICD-10-CM

## 2023-05-10 DIAGNOSIS — F41.9 ANXIETY: ICD-10-CM

## 2023-05-10 DIAGNOSIS — I10 ESSENTIAL HYPERTENSION: Primary | ICD-10-CM

## 2023-05-10 LAB
ALBUMIN SERPL-MCNC: 4.4 G/DL (ref 3.5–4.6)
ALP SERPL-CCNC: 61 U/L (ref 35–104)
ALT SERPL-CCNC: 13 U/L (ref 0–41)
ANION GAP SERPL CALCULATED.3IONS-SCNC: 8 MEQ/L (ref 9–15)
AST SERPL-CCNC: 19 U/L (ref 0–40)
BILIRUB SERPL-MCNC: 0.5 MG/DL (ref 0.2–0.7)
BUN SERPL-MCNC: 12 MG/DL (ref 8–23)
CALCIUM SERPL-MCNC: 9.6 MG/DL (ref 8.5–9.9)
CHLORIDE SERPL-SCNC: 104 MEQ/L (ref 95–107)
CHOLEST SERPL-MCNC: 204 MG/DL (ref 0–199)
CO2 SERPL-SCNC: 27 MEQ/L (ref 20–31)
CREAT SERPL-MCNC: 0.9 MG/DL (ref 0.7–1.2)
ERYTHROCYTE [DISTWIDTH] IN BLOOD BY AUTOMATED COUNT: 14.5 % (ref 11.5–14.5)
GLOBULIN SER CALC-MCNC: 2.6 G/DL (ref 2.3–3.5)
GLUCOSE SERPL-MCNC: 95 MG/DL (ref 70–99)
HBA1C MFR BLD: 6 % (ref 4.8–5.9)
HCT VFR BLD AUTO: 41.4 % (ref 42–52)
HDLC SERPL-MCNC: 64 MG/DL (ref 40–59)
HGB BLD-MCNC: 13.9 G/DL (ref 14–18)
LDLC SERPL CALC-MCNC: 123 MG/DL (ref 0–129)
MCH RBC QN AUTO: 30.3 PG (ref 27–31.3)
MCHC RBC AUTO-ENTMCNC: 33.6 % (ref 33–37)
MCV RBC AUTO: 90.1 FL (ref 79–92.2)
PLATELET # BLD AUTO: 270 K/UL (ref 130–400)
POTASSIUM SERPL-SCNC: 4.2 MEQ/L (ref 3.4–4.9)
PROT SERPL-MCNC: 7 G/DL (ref 6.3–8)
PSA SERPL-MCNC: 2.07 NG/ML (ref 0–4)
RBC # BLD AUTO: 4.59 M/UL (ref 4.7–6.1)
SODIUM SERPL-SCNC: 139 MEQ/L (ref 135–144)
TRIGL SERPL-MCNC: 84 MG/DL (ref 0–150)
WBC # BLD AUTO: 8.1 K/UL (ref 4.8–10.8)

## 2023-05-10 RX ORDER — AMLODIPINE BESYLATE 2.5 MG/1
2.5 TABLET ORAL DAILY
Qty: 30 TABLET | Refills: 5 | Status: SHIPPED | OUTPATIENT
Start: 2023-05-10

## 2023-05-10 SDOH — ECONOMIC STABILITY: HOUSING INSECURITY
IN THE LAST 12 MONTHS, WAS THERE A TIME WHEN YOU DID NOT HAVE A STEADY PLACE TO SLEEP OR SLEPT IN A SHELTER (INCLUDING NOW)?: NO

## 2023-05-10 SDOH — ECONOMIC STABILITY: FOOD INSECURITY: WITHIN THE PAST 12 MONTHS, THE FOOD YOU BOUGHT JUST DIDN'T LAST AND YOU DIDN'T HAVE MONEY TO GET MORE.: NEVER TRUE

## 2023-05-10 SDOH — ECONOMIC STABILITY: INCOME INSECURITY: HOW HARD IS IT FOR YOU TO PAY FOR THE VERY BASICS LIKE FOOD, HOUSING, MEDICAL CARE, AND HEATING?: NOT HARD AT ALL

## 2023-05-10 SDOH — ECONOMIC STABILITY: FOOD INSECURITY: WITHIN THE PAST 12 MONTHS, YOU WORRIED THAT YOUR FOOD WOULD RUN OUT BEFORE YOU GOT MONEY TO BUY MORE.: NEVER TRUE

## 2023-05-10 NOTE — PROGRESS NOTES
bowel habits, or black or bloody stools  Genito-Urinary ROS: no dysuria, trouble voiding  Musculoskeletal ROS: negative for - gait disturbance, joint pain or joint stiffness  Neurological ROS: normal speech today, vertigo     In general patient otherwise reports feeling well. Physical Exam:  /70 (Site: Left Upper Arm)   Pulse 64   Temp 96.8 °F (36 °C)   Ht 5' 9\" (1.753 m)   Wt 200 lb 3.2 oz (90.8 kg)   SpO2 98%   BMI 29.56 kg/m²     Gen: Well, NAD, Alert, Oriented x 3   HEENT: EOMI, eyes clear, MMM, left eye has improved, gaze not deviating, eye not swollen  Skin: SKs on back   Neck: no significant lymphadenopathy or thyromegaly  Lungs: CTA B w/out Rales/Wheezes/Rhonchi, Good respiratory effort   Heart: RRR, S1S2, w/out M/R/G, non-displaced PMI   Ext: No C/C/E Bilaterally. Neuro: currently nonfocal  Psych: euthymic         Lab Results   Component Value Date    WBC 6.9 09/04/2022    HGB 13.7 (L) 09/04/2022    HCT 40.5 (L) 09/04/2022     09/04/2022    CHOL 135 01/10/2022    TRIG 81 01/10/2022    HDL 33 (L) 01/10/2022    ALT 16 09/04/2022    AST 20 09/04/2022     09/04/2022    K 4.0 09/04/2022     09/04/2022    CREATININE 1.1 11/02/2022    BUN 9 09/04/2022    CO2 25 09/04/2022    TSH 2.650 12/13/2020    PSA 1.52 12/13/2020    INR 1.0 12/13/2021    LABA1C 6.0 (H) 09/22/2022    LABMICR 4.10 (H) 11/15/2021         A&P   Diagnosis Orders   1. Essential hypertension  amLODIPine (NORVASC) 2.5 MG tablet      2. Cerebrovascular accident (CVA) due to thrombosis of precerebral artery ([address]        3. Type 2 diabetes mellitus with hyperglycemia, without long-term current use of insulin (HCC)  Hemoglobin A1C    Lipid Panel    Microalbumin / Creatinine Urine Ratio    CBC    Comprehensive Metabolic Panel      4. Vascular dementia without behavioral disturbance ([facility] [address]        5. Memory loss        6. Anxiety        7.  Screening PSA (prostate specific antigen)  PSA Screening                 Wt

## 2023-05-11 LAB
CREAT UR-MCNC: 125.6 MG/DL
MICROALBUMIN UR-MCNC: 1.9 MG/DL
MICROALBUMIN/CREAT UR-RTO: 15.1 MG/G (ref 0–30)

## 2023-08-07 ENCOUNTER — TELEPHONE (OUTPATIENT)
Dept: FAMILY MEDICINE CLINIC | Age: 68
End: 2023-08-07

## 2023-09-10 DIAGNOSIS — I63.9 CEREBROVASCULAR ACCIDENT (CVA), UNSPECIFIED MECHANISM (HCC): ICD-10-CM

## 2023-09-10 DIAGNOSIS — I10 ESSENTIAL HYPERTENSION: ICD-10-CM

## 2023-09-10 DIAGNOSIS — I63.00 CEREBROVASCULAR ACCIDENT (CVA) DUE TO THROMBOSIS OF PRECEREBRAL ARTERY (HCC): ICD-10-CM

## 2023-09-10 DIAGNOSIS — E11.65 TYPE 2 DIABETES MELLITUS WITH HYPERGLYCEMIA, WITHOUT LONG-TERM CURRENT USE OF INSULIN (HCC): ICD-10-CM

## 2023-09-11 RX ORDER — PRAVASTATIN SODIUM 40 MG
40 TABLET ORAL DAILY
Qty: 30 TABLET | Refills: 5 | Status: SHIPPED | OUTPATIENT
Start: 2023-09-11

## 2023-09-11 RX ORDER — DONEPEZIL HYDROCHLORIDE 10 MG/1
TABLET, FILM COATED ORAL
Qty: 30 TABLET | Refills: 5 | Status: SHIPPED | OUTPATIENT
Start: 2023-09-11

## 2023-09-11 RX ORDER — CLOPIDOGREL BISULFATE 75 MG/1
75 TABLET ORAL DAILY
Qty: 30 TABLET | Refills: 5 | Status: SHIPPED | OUTPATIENT
Start: 2023-09-11

## 2023-09-11 RX ORDER — LOSARTAN POTASSIUM 100 MG/1
100 TABLET ORAL DAILY
Qty: 30 TABLET | Refills: 5 | Status: SHIPPED | OUTPATIENT
Start: 2023-09-11

## 2023-09-11 NOTE — TELEPHONE ENCOUNTER
Comments:     Last Office Visit (last PCP visit):   6/15/2023    Next Visit Date:  Future Appointments   Date Time Provider [address]   9/26/2023  9:15 AM Jesus Segura MD [facility] [place]       **If hasn't been seen in over a year OR hasn't followed up according to last diabetes/ADHD visit, make appointment for patient before sending refill to provider.     Rx requested:  Requested Prescriptions     Pending Prescriptions Disp Refills    losartan (COZAAR) 100 MG tablet [Pharmacy Med Name: losartan 100 mg tablet] 30 tablet 5     Sig: Take 1 tablet by mouth daily    donepezil (ARICEPT) 10 MG tablet [Pharmacy Med Name: donepezil 10 mg tablet] 30 tablet 5     Sig: TAKE 1 TABLET BY MOUTH DAILY EVERY MORNING    metFORMIN (GLUCOPHAGE) 1000 MG tablet [Pharmacy Med Name: metformin 1,000 mg tablet] 60 tablet 5     Sig: TAKE 1 TABLET BY MOUTH TWICE DAILY WITH MEALS    pravastatin (PRAVACHOL) 40 MG tablet [Pharmacy Med Name: pravastatin 40 mg tablet] 30 tablet 5     Sig: Take 1 tablet by mouth daily    clopidogrel (PLAVIX) 75 MG tablet [Pharmacy Med Name: clopidogrel 75 mg tablet] 30 tablet 5     Sig: Take 1 tablet by mouth daily

## 2023-09-26 ENCOUNTER — OFFICE VISIT (OUTPATIENT)
Dept: FAMILY MEDICINE CLINIC | Age: 68
End: 2023-09-26
Payer: MEDICARE

## 2023-09-26 VITALS
WEIGHT: 196 LBS | HEIGHT: 69 IN | BODY MASS INDEX: 29.03 KG/M2 | SYSTOLIC BLOOD PRESSURE: 138 MMHG | OXYGEN SATURATION: 96 % | DIASTOLIC BLOOD PRESSURE: 72 MMHG | TEMPERATURE: 97.6 F | HEART RATE: 65 BPM

## 2023-09-26 DIAGNOSIS — I10 ESSENTIAL HYPERTENSION: Primary | ICD-10-CM

## 2023-09-26 DIAGNOSIS — R41.3 MEMORY LOSS: ICD-10-CM

## 2023-09-26 DIAGNOSIS — E11.65 TYPE 2 DIABETES MELLITUS WITH HYPERGLYCEMIA, WITHOUT LONG-TERM CURRENT USE OF INSULIN (HCC): ICD-10-CM

## 2023-09-26 DIAGNOSIS — F01.50 VASCULAR DEMENTIA WITHOUT BEHAVIORAL DISTURBANCE (HCC): ICD-10-CM

## 2023-09-26 DIAGNOSIS — I63.00 CEREBROVASCULAR ACCIDENT (CVA) DUE TO THROMBOSIS OF PRECEREBRAL ARTERY (HCC): ICD-10-CM

## 2023-09-26 LAB
ALBUMIN SERPL-MCNC: 4.6 G/DL (ref 3.5–4.6)
ALP SERPL-CCNC: 53 U/L (ref 35–104)
ALT SERPL-CCNC: 6 U/L (ref 0–41)
ANION GAP SERPL CALCULATED.3IONS-SCNC: 12 MEQ/L (ref 9–15)
AST SERPL-CCNC: 22 U/L (ref 0–40)
BILIRUB SERPL-MCNC: 0.3 MG/DL (ref 0.2–0.7)
BUN SERPL-MCNC: 10 MG/DL (ref 8–23)
CALCIUM SERPL-MCNC: 9.3 MG/DL (ref 8.5–9.9)
CHLORIDE SERPL-SCNC: 106 MEQ/L (ref 95–107)
CO2 SERPL-SCNC: 25 MEQ/L (ref 20–31)
CREAT SERPL-MCNC: 0.97 MG/DL (ref 0.7–1.2)
ERYTHROCYTE [DISTWIDTH] IN BLOOD BY AUTOMATED COUNT: 13.7 % (ref 11.5–14.5)
GLOBULIN SER CALC-MCNC: 2.1 G/DL (ref 2.3–3.5)
GLUCOSE SERPL-MCNC: 90 MG/DL (ref 70–99)
HBA1C MFR BLD: 5.8 % (ref 4.8–5.9)
HCT VFR BLD AUTO: 42.8 % (ref 42–52)
HGB BLD-MCNC: 13.7 G/DL (ref 14–18)
MCH RBC QN AUTO: 29.9 PG (ref 27–31.3)
MCHC RBC AUTO-ENTMCNC: 32 % (ref 33–37)
MCV RBC AUTO: 93.4 FL (ref 79–92.2)
PLATELET # BLD AUTO: 350 K/UL (ref 130–400)
POTASSIUM SERPL-SCNC: 4.5 MEQ/L (ref 3.4–4.9)
PROT SERPL-MCNC: 6.7 G/DL (ref 6.3–8)
RBC # BLD AUTO: 4.58 M/UL (ref 4.7–6.1)
SODIUM SERPL-SCNC: 143 MEQ/L (ref 135–144)
WBC # BLD AUTO: 7.1 K/UL (ref 4.8–10.8)

## 2023-09-26 PROCEDURE — 3078F DIAST BP <80 MM HG: CPT | Performed by: FAMILY MEDICINE

## 2023-09-26 PROCEDURE — 99213 OFFICE O/P EST LOW 20 MIN: CPT | Performed by: FAMILY MEDICINE

## 2023-09-26 PROCEDURE — 3075F SYST BP GE 130 - 139MM HG: CPT | Performed by: FAMILY MEDICINE

## 2023-09-26 PROCEDURE — G8427 DOCREV CUR MEDS BY ELIG CLIN: HCPCS | Performed by: FAMILY MEDICINE

## 2023-09-26 PROCEDURE — 3017F COLORECTAL CA SCREEN DOC REV: CPT | Performed by: FAMILY MEDICINE

## 2023-09-26 PROCEDURE — 3044F HG A1C LEVEL LT 7.0%: CPT | Performed by: FAMILY MEDICINE

## 2023-09-26 PROCEDURE — 2022F DILAT RTA XM EVC RTNOPTHY: CPT | Performed by: FAMILY MEDICINE

## 2023-09-26 PROCEDURE — G8417 CALC BMI ABV UP PARAM F/U: HCPCS | Performed by: FAMILY MEDICINE

## 2023-09-26 PROCEDURE — 1123F ACP DISCUSS/DSCN MKR DOCD: CPT | Performed by: FAMILY MEDICINE

## 2023-09-26 PROCEDURE — 1036F TOBACCO NON-USER: CPT | Performed by: FAMILY MEDICINE

## 2023-09-26 NOTE — PROGRESS NOTES
Check labs  Appt not scheduled as AWV and nearly missed appt, so did not complete AWV questions today     Overall stable          Wt Readings from Last 3 Encounters:   09/26/23 196 lb (88.9 kg)   06/15/23 199 lb (90.3 kg)   05/10/23 200 lb 3.2 oz (90.8 kg)             Shira Barrett MD

## 2023-11-21 ENCOUNTER — OFFICE VISIT (OUTPATIENT)
Dept: FAMILY MEDICINE CLINIC | Age: 68
End: 2023-11-21
Payer: MEDICARE

## 2023-11-21 VITALS
HEART RATE: 71 BPM | TEMPERATURE: 97.2 F | WEIGHT: 192 LBS | BODY MASS INDEX: 29.1 KG/M2 | HEIGHT: 68 IN | DIASTOLIC BLOOD PRESSURE: 70 MMHG | SYSTOLIC BLOOD PRESSURE: 132 MMHG | OXYGEN SATURATION: 96 %

## 2023-11-21 DIAGNOSIS — H66.001 NON-RECURRENT ACUTE SUPPURATIVE OTITIS MEDIA OF RIGHT EAR WITHOUT SPONTANEOUS RUPTURE OF TYMPANIC MEMBRANE: Primary | ICD-10-CM

## 2023-11-21 PROCEDURE — 1123F ACP DISCUSS/DSCN MKR DOCD: CPT

## 2023-11-21 PROCEDURE — G8417 CALC BMI ABV UP PARAM F/U: HCPCS

## 2023-11-21 PROCEDURE — 3078F DIAST BP <80 MM HG: CPT

## 2023-11-21 PROCEDURE — 3017F COLORECTAL CA SCREEN DOC REV: CPT

## 2023-11-21 PROCEDURE — 99213 OFFICE O/P EST LOW 20 MIN: CPT

## 2023-11-21 PROCEDURE — 3075F SYST BP GE 130 - 139MM HG: CPT

## 2023-11-21 PROCEDURE — G8484 FLU IMMUNIZE NO ADMIN: HCPCS

## 2023-11-21 PROCEDURE — 1036F TOBACCO NON-USER: CPT

## 2023-11-21 PROCEDURE — G8427 DOCREV CUR MEDS BY ELIG CLIN: HCPCS

## 2023-11-21 RX ORDER — AMOXICILLIN 875 MG/1
875 TABLET, COATED ORAL 2 TIMES DAILY
Qty: 20 TABLET | Refills: 0 | Status: SHIPPED | OUTPATIENT
Start: 2023-11-21 | End: 2023-12-01

## 2023-11-21 ASSESSMENT — ENCOUNTER SYMPTOMS
RHINORRHEA: 1
CHEST TIGHTNESS: 0
SHORTNESS OF BREATH: 0
SORE THROAT: 0
WHEEZING: 0
SINUS PAIN: 0
COUGH: 1
BACK PAIN: 0
SINUS PRESSURE: 0

## 2023-12-12 DIAGNOSIS — E11.65 TYPE 2 DIABETES MELLITUS WITH HYPERGLYCEMIA, WITHOUT LONG-TERM CURRENT USE OF INSULIN (HCC): ICD-10-CM

## 2023-12-13 RX ORDER — PIOGLITAZONEHYDROCHLORIDE 30 MG/1
30 TABLET ORAL DAILY
Qty: 30 TABLET | Refills: 5 | Status: SHIPPED | OUTPATIENT
Start: 2023-12-13

## 2023-12-13 RX ORDER — MEMANTINE HYDROCHLORIDE 5 MG/1
TABLET ORAL
Qty: 60 TABLET | Refills: 5 | Status: SHIPPED | OUTPATIENT
Start: 2023-12-13

## 2023-12-13 NOTE — TELEPHONE ENCOUNTER
Comments:     Last Office Visit (last PCP visit):   9/26/2023    Next Visit Date:  No future appointments. **If hasn't been seen in over a year OR hasn't followed up according to last diabetes/ADHD visit, make appointment for patient before sending refill to provider.     Rx requested:  Requested Prescriptions     Pending Prescriptions Disp Refills    pioglitazone (ACTOS) 30 MG tablet [Pharmacy Med Name: pioglitazone 30 mg tablet] 30 tablet 5     Sig: Take 1 tablet by mouth daily    memantine (NAMENDA) 5 MG tablet [Pharmacy Med Name: memantine 5 mg tablet] 60 tablet 5     Sig: TAKE 1 TABLET BY MOUTH TWICE DAILY

## 2024-01-23 ENCOUNTER — TELEPHONE (OUTPATIENT)
Dept: FAMILY MEDICINE CLINIC | Age: 69
End: 2024-01-23

## 2024-02-15 ENCOUNTER — OFFICE VISIT (OUTPATIENT)
Dept: FAMILY MEDICINE CLINIC | Age: 69
End: 2024-02-15

## 2024-02-15 VITALS
SYSTOLIC BLOOD PRESSURE: 130 MMHG | OXYGEN SATURATION: 97 % | TEMPERATURE: 97 F | HEIGHT: 68 IN | DIASTOLIC BLOOD PRESSURE: 80 MMHG | WEIGHT: 198 LBS | BODY MASS INDEX: 30.01 KG/M2 | HEART RATE: 65 BPM

## 2024-02-15 DIAGNOSIS — F01.50 VASCULAR DEMENTIA WITHOUT BEHAVIORAL DISTURBANCE (HCC): ICD-10-CM

## 2024-02-15 DIAGNOSIS — Z12.5 SCREENING PSA (PROSTATE SPECIFIC ANTIGEN): ICD-10-CM

## 2024-02-15 DIAGNOSIS — I10 ESSENTIAL HYPERTENSION: Primary | ICD-10-CM

## 2024-02-15 DIAGNOSIS — E11.65 TYPE 2 DIABETES MELLITUS WITH HYPERGLYCEMIA, WITHOUT LONG-TERM CURRENT USE OF INSULIN (HCC): ICD-10-CM

## 2024-02-15 NOTE — PROGRESS NOTES
Chief Complaint   Patient presents with    Hypertension     Check up    Health Maintenance     Refused colon        HPI:  Ady Rodriguez is a 68 y.o. male    Here with wife    Glucose around 100, due for labs/a1c    Stopped amlodipine due to lightheadedness     Would like to check labs       Hx CVA, HTN, dyslipidemia, diabetes        Recent MRA brain showed the results below   1. High-grade stenosis in the distal right vertebral artery.  Further   evaluation with CTA of the neck or pre and post contrast enhanced MRA of the   neck is recommended.   2. Moderate stenoses in the P2 segment of the right posterior cerebral artery.   3. Mild-to-moderate stenosis in the proximal left cavernous ICA.   4. The MRA of the brain from 11/18/2013 was normal.             Lab Results   Component Value Date    LABA1C 5.8 09/26/2023     No results found for: \"EAG\"      Social History     Socioeconomic History    Marital status:      Spouse name: None    Number of children: None    Years of education: None    Highest education level: None   Tobacco Use    Smoking status: Former     Current packs/day: 0.00     Average packs/day: 0.5 packs/day for 37.9 years (18.9 ttl pk-yrs)     Types: Cigarettes     Start date: 1/1/1976     Quit date: 11/11/2013     Years since quitting: 10.2    Smokeless tobacco: Never   Substance and Sexual Activity    Alcohol use: Yes     Comment: social - only small amount on Friday, sometimes heavier on Saturday     Social Determinants of Health     Financial Resource Strain: Low Risk  (5/10/2023)    Overall Financial Resource Strain (CARDIA)     Difficulty of Paying Living Expenses: Not hard at all   Transportation Needs: Unknown (5/10/2023)    PRAPARE - Transportation     Lack of Transportation (Non-Medical): No   Housing Stability: Unknown (5/10/2023)    Housing Stability Vital Sign     Unstable Housing in the Last Year: No             Wt Readings from Last 3 Encounters:   02/15/24 89.8 kg (198 lb)

## 2024-02-20 DIAGNOSIS — E11.65 TYPE 2 DIABETES MELLITUS WITH HYPERGLYCEMIA, WITHOUT LONG-TERM CURRENT USE OF INSULIN (HCC): ICD-10-CM

## 2024-02-20 DIAGNOSIS — I10 ESSENTIAL HYPERTENSION: ICD-10-CM

## 2024-02-20 DIAGNOSIS — Z12.5 SCREENING PSA (PROSTATE SPECIFIC ANTIGEN): ICD-10-CM

## 2024-02-20 LAB
ALBUMIN SERPL-MCNC: 4.6 G/DL (ref 3.5–4.6)
ALP SERPL-CCNC: 65 U/L (ref 35–104)
ALT SERPL-CCNC: 18 U/L (ref 0–41)
ANION GAP SERPL CALCULATED.3IONS-SCNC: 10 MEQ/L (ref 9–15)
AST SERPL-CCNC: 20 U/L (ref 0–40)
BILIRUB SERPL-MCNC: 0.3 MG/DL (ref 0.2–0.7)
BUN SERPL-MCNC: 17 MG/DL (ref 8–23)
CALCIUM SERPL-MCNC: 9.5 MG/DL (ref 8.5–9.9)
CHLORIDE SERPL-SCNC: 104 MEQ/L (ref 95–107)
CHOLEST SERPL-MCNC: 193 MG/DL (ref 0–199)
CO2 SERPL-SCNC: 26 MEQ/L (ref 20–31)
CREAT SERPL-MCNC: 1.04 MG/DL (ref 0.7–1.2)
ERYTHROCYTE [DISTWIDTH] IN BLOOD BY AUTOMATED COUNT: 13.5 % (ref 11.5–14.5)
GLOBULIN SER CALC-MCNC: 2.4 G/DL (ref 2.3–3.5)
GLUCOSE SERPL-MCNC: 89 MG/DL (ref 70–99)
HBA1C MFR BLD: 5.8 % (ref 4.8–5.9)
HCT VFR BLD AUTO: 44.3 % (ref 42–52)
HDLC SERPL-MCNC: 69 MG/DL (ref 40–59)
HGB BLD-MCNC: 14.1 G/DL (ref 14–18)
LDLC SERPL CALC-MCNC: 114 MG/DL (ref 0–129)
MCH RBC QN AUTO: 29.4 PG (ref 27–31.3)
MCHC RBC AUTO-ENTMCNC: 31.8 % (ref 33–37)
MCV RBC AUTO: 92.5 FL (ref 79–92.2)
PLATELET # BLD AUTO: 328 K/UL (ref 130–400)
POTASSIUM SERPL-SCNC: 4.1 MEQ/L (ref 3.4–4.9)
PROT SERPL-MCNC: 7 G/DL (ref 6.3–8)
PSA SERPL-MCNC: 2.07 NG/ML (ref 0–4)
RBC # BLD AUTO: 4.79 M/UL (ref 4.7–6.1)
SODIUM SERPL-SCNC: 140 MEQ/L (ref 135–144)
TRIGL SERPL-MCNC: 50 MG/DL (ref 0–150)
WBC # BLD AUTO: 7.9 K/UL (ref 4.8–10.8)

## 2024-03-07 DIAGNOSIS — E11.65 TYPE 2 DIABETES MELLITUS WITH HYPERGLYCEMIA, WITHOUT LONG-TERM CURRENT USE OF INSULIN (HCC): ICD-10-CM

## 2024-03-07 LAB
CREAT UR-MCNC: 134.2 MG/DL
MICROALBUMIN UR-MCNC: <1.2 MG/DL
MICROALBUMIN/CREAT UR-RTO: NORMAL MG/G (ref 0–30)

## 2024-03-08 DIAGNOSIS — I63.00 CEREBROVASCULAR ACCIDENT (CVA) DUE TO THROMBOSIS OF PRECEREBRAL ARTERY (HCC): ICD-10-CM

## 2024-03-08 DIAGNOSIS — I10 ESSENTIAL HYPERTENSION: ICD-10-CM

## 2024-03-08 DIAGNOSIS — I63.9 CEREBROVASCULAR ACCIDENT (CVA), UNSPECIFIED MECHANISM (HCC): ICD-10-CM

## 2024-03-08 RX ORDER — DONEPEZIL HYDROCHLORIDE 10 MG/1
TABLET, FILM COATED ORAL
Qty: 30 TABLET | Refills: 5 | Status: SHIPPED | OUTPATIENT
Start: 2024-03-08

## 2024-03-08 RX ORDER — PRAVASTATIN SODIUM 40 MG
40 TABLET ORAL DAILY
Qty: 30 TABLET | Refills: 5 | Status: SHIPPED | OUTPATIENT
Start: 2024-03-08

## 2024-03-08 RX ORDER — CLOPIDOGREL BISULFATE 75 MG/1
75 TABLET ORAL DAILY
Qty: 30 TABLET | Refills: 5 | Status: SHIPPED | OUTPATIENT
Start: 2024-03-08

## 2024-03-08 RX ORDER — LOSARTAN POTASSIUM 100 MG/1
100 TABLET ORAL DAILY
Qty: 30 TABLET | Refills: 5 | Status: SHIPPED | OUTPATIENT
Start: 2024-03-08

## 2024-03-08 NOTE — TELEPHONE ENCOUNTER
Future Appointments     LMTCB & SCHEDULE AUG APPOINTMENT     This patient does not currently have any appointments scheduled.  Past Visits    Date Provider Specialty Visit Type Primary Dx   02/15/2024 Farooq Moreno MD Family Medicine Office Visit Essential hypertension   11/21/2023 Vivi Leong, APRN - NP Family Medicine Office Visit Non-recurrent acute suppurative otitis media of right ear without spontaneous rupture of tympanic membrane   09/26/2023 Farooq Moreno MD Family Medicine Office Visit Essential hypertension

## 2024-03-25 DIAGNOSIS — F41.9 ANXIETY: ICD-10-CM

## 2024-03-25 RX ORDER — ESCITALOPRAM OXALATE 5 MG/1
5 TABLET ORAL DAILY
Qty: 30 TABLET | Refills: 5 | Status: SHIPPED | OUTPATIENT
Start: 2024-03-25

## 2024-03-25 NOTE — TELEPHONE ENCOUNTER
Comments: pt will call to schedule appt they are out of town, pt is having anxiety issues going on again.    Last Office Visit (last PCP visit):   2/15/2024    Next Visit Date:  No future appointments.    **If hasn't been seen in over a year OR hasn't followed up according to last diabetes/ADHD visit, make appointment for patient before sending refill to provider.    Rx requested:  Requested Prescriptions     Pending Prescriptions Disp Refills    escitalopram (LEXAPRO) 5 MG tablet 30 tablet 5     Sig: Take 1 tablet by mouth daily

## 2024-06-09 DIAGNOSIS — E11.65 TYPE 2 DIABETES MELLITUS WITH HYPERGLYCEMIA, WITHOUT LONG-TERM CURRENT USE OF INSULIN (HCC): ICD-10-CM

## 2024-06-10 RX ORDER — PIOGLITAZONEHYDROCHLORIDE 30 MG/1
30 TABLET ORAL DAILY
Qty: 30 TABLET | Refills: 5 | Status: SHIPPED | OUTPATIENT
Start: 2024-06-10

## 2024-06-10 RX ORDER — MEMANTINE HYDROCHLORIDE 5 MG/1
TABLET ORAL
Qty: 60 TABLET | Refills: 5 | Status: SHIPPED | OUTPATIENT
Start: 2024-06-10

## 2024-06-10 NOTE — TELEPHONE ENCOUNTER
Comments:     Last Office Visit (last PCP visit):   2/15/2024    Next Visit Date:  Future Appointments   Date Time Provider Department Center   8/28/2024 11:30 AM Farooq Moreno MD [facility] [place]       **If hasn't been seen in over a year OR hasn't followed up according to last diabetes/ADHD visit, make appointment for patient before sending refill to provider.    Rx requested:  Requested Prescriptions     Pending Prescriptions Disp Refills    memantine (NAMENDA) 5 MG tablet [Pharmacy Med Name: memantine 5 mg tablet] 60 tablet 5     Sig: TAKE 1 TABLET BY MOUTH TWICE DAILY    pioglitazone (ACTOS) 30 MG tablet [Pharmacy Med Name: pioglitazone 30 mg tablet] 30 tablet 5     Sig: Take 1 tablet by mouth daily

## 2024-08-28 ENCOUNTER — OFFICE VISIT (OUTPATIENT)
Dept: FAMILY MEDICINE CLINIC | Age: 69
End: 2024-08-28
Payer: MEDICARE

## 2024-08-28 VITALS
WEIGHT: 191 LBS | OXYGEN SATURATION: 96 % | TEMPERATURE: 98.1 F | BODY MASS INDEX: 28.29 KG/M2 | HEART RATE: 59 BPM | DIASTOLIC BLOOD PRESSURE: 68 MMHG | HEIGHT: 69 IN | SYSTOLIC BLOOD PRESSURE: 130 MMHG

## 2024-08-28 DIAGNOSIS — I10 ESSENTIAL HYPERTENSION: ICD-10-CM

## 2024-08-28 DIAGNOSIS — F01.50 VASCULAR DEMENTIA WITHOUT BEHAVIORAL DISTURBANCE (HCC): ICD-10-CM

## 2024-08-28 DIAGNOSIS — Z00.00 INITIAL MEDICARE ANNUAL WELLNESS VISIT: Primary | ICD-10-CM

## 2024-08-28 DIAGNOSIS — E11.65 TYPE 2 DIABETES MELLITUS WITH HYPERGLYCEMIA, WITHOUT LONG-TERM CURRENT USE OF INSULIN (HCC): ICD-10-CM

## 2024-08-28 DIAGNOSIS — F41.9 ANXIETY: ICD-10-CM

## 2024-08-28 DIAGNOSIS — I63.9 CEREBROVASCULAR ACCIDENT (CVA), UNSPECIFIED MECHANISM (HCC): ICD-10-CM

## 2024-08-28 DIAGNOSIS — I63.00 CEREBROVASCULAR ACCIDENT (CVA) DUE TO THROMBOSIS OF PRECEREBRAL ARTERY (HCC): ICD-10-CM

## 2024-08-28 PROCEDURE — 3078F DIAST BP <80 MM HG: CPT | Performed by: FAMILY MEDICINE

## 2024-08-28 PROCEDURE — 1123F ACP DISCUSS/DSCN MKR DOCD: CPT | Performed by: FAMILY MEDICINE

## 2024-08-28 PROCEDURE — 3017F COLORECTAL CA SCREEN DOC REV: CPT | Performed by: FAMILY MEDICINE

## 2024-08-28 PROCEDURE — 3075F SYST BP GE 130 - 139MM HG: CPT | Performed by: FAMILY MEDICINE

## 2024-08-28 PROCEDURE — 3044F HG A1C LEVEL LT 7.0%: CPT | Performed by: FAMILY MEDICINE

## 2024-08-28 PROCEDURE — G0438 PPPS, INITIAL VISIT: HCPCS | Performed by: FAMILY MEDICINE

## 2024-08-28 RX ORDER — PRAVASTATIN SODIUM 40 MG
40 TABLET ORAL DAILY
Qty: 30 TABLET | Refills: 5 | Status: SHIPPED | OUTPATIENT
Start: 2024-08-28

## 2024-08-28 RX ORDER — CLOPIDOGREL BISULFATE 75 MG/1
75 TABLET ORAL DAILY
Qty: 30 TABLET | Refills: 5 | Status: SHIPPED | OUTPATIENT
Start: 2024-08-28

## 2024-08-28 RX ORDER — DONEPEZIL HYDROCHLORIDE 10 MG/1
TABLET, FILM COATED ORAL
Qty: 30 TABLET | Refills: 5 | Status: SHIPPED | OUTPATIENT
Start: 2024-08-28

## 2024-08-28 RX ORDER — LOSARTAN POTASSIUM 100 MG/1
100 TABLET ORAL DAILY
Qty: 30 TABLET | Refills: 5 | Status: SHIPPED | OUTPATIENT
Start: 2024-08-28

## 2024-08-28 ASSESSMENT — LIFESTYLE VARIABLES
HOW MANY STANDARD DRINKS CONTAINING ALCOHOL DO YOU HAVE ON A TYPICAL DAY: PATIENT DOES NOT DRINK
HOW OFTEN DO YOU HAVE A DRINK CONTAINING ALCOHOL: NEVER

## 2024-08-28 ASSESSMENT — PATIENT HEALTH QUESTIONNAIRE - PHQ9
SUM OF ALL RESPONSES TO PHQ QUESTIONS 1-9: 0
SUM OF ALL RESPONSES TO PHQ9 QUESTIONS 1 & 2: 0
SUM OF ALL RESPONSES TO PHQ QUESTIONS 1-9: 0
SUM OF ALL RESPONSES TO PHQ QUESTIONS 1-9: 0
1. LITTLE INTEREST OR PLEASURE IN DOING THINGS: NOT AT ALL
SUM OF ALL RESPONSES TO PHQ QUESTIONS 1-9: 0
2. FEELING DOWN, DEPRESSED OR HOPELESS: NOT AT ALL

## 2024-08-28 NOTE — PATIENT INSTRUCTIONS
Everyone starts somewhere.  How can you find safe ways to stay active?  Talk with your doctor about any physical challenges you're facing. Make a plan with your doctor if you have a health problem or aren't sure how to get started with activity.  If you're already active, ask your doctor if there is anything you should change to stay safe as your body and health change.  If you tend to feel dizzy after you take medicine, avoid activity at that time. Try being active before you take your medicine. This will reduce your risk of falls.  If you plan to be active at home, make sure to clear your space before you get started. Remove things like TV cords, coffee tables, and throw rugs. It's safest to have plenty of space to move freely.  The key to getting more active is to take it slow and steady. Try to improve only a little bit at a time. Pick just one area to improve on at first. And if an activity hurts, stop and talk to your doctor.  Where can you learn more?  Go to https://www.[employer].net/patientEd and enter P600 to learn more about \"Learning About Being Active as an Older Adult.\"  Current as of: June 5, 2023  Content Version: 14.1  © 2006-2024 [employer].   Care instructions adapted under license by [employer]. If you have questions about a medical condition or this instruction, always ask your healthcare professional. [employer] disclaims any warranty or liability for your use of this information.           Learning About Vision Tests  What are vision tests?     The four most common vision tests are visual acuity tests, refraction, visual field tests, and color vision tests.  Visual acuity (sharpness) tests  These tests are used:  To see if you need glasses or contact lenses.  To monitor an eye problem.  To check an eye injury.  Visual acuity tests are done as part of routine exams. You may also have this test when you get your 's license or apply for some types of  your medical record and screening and assessments performed today your provider may have ordered immunizations, labs, imaging, and/or referrals for you.  A list of these orders (if applicable) as well as your Preventive Care list are included within your After Visit Summary for your review.    Other Preventive Recommendations:    A preventive eye exam performed by an eye specialist is recommended every 1-2 years to screen for glaucoma; cataracts, macular degeneration, and other eye disorders.  A preventive dental visit is recommended every 6 months.  Try to get at least 150 minutes of exercise per week or 10,000 steps per day on a pedometer .  Order or download the FREE \"Exercise & Physical Activity: Your Everyday Guide\" from [employer]. Call 1-466.817.8977 or search [employer] online.  You need 3455-4844 mg of calcium and 4761-2062 IU of vitamin D per day. It is possible to meet your calcium requirement with diet alone, but a vitamin D supplement is usually necessary to meet this goal.  When exposed to the sun, use a sunscreen that protects against both UVA and UVB radiation with an SPF of 30 or greater. Reapply every 2 to 3 hours or after sweating, drying off with a towel, or swimming.  Always wear a seat belt when traveling in a car. Always wear a helmet when riding a bicycle or motorcycle.

## 2024-08-28 NOTE — PROGRESS NOTES
Medicare Annual Wellness Visit    Ady Rodriguez is here for Medicare AWV    Assessment & Plan   Initial Medicare annual wellness visit  Type 2 diabetes mellitus with hyperglycemia, without long-term current use of insulin (HCC)  -     CBC; Future  -     Comprehensive Metabolic Panel; Future  -     Hemoglobin A1C; Future  -     Lipid Panel; Future  Anxiety  -     TSH; Future  Essential hypertension  Cerebrovascular accident (CVA), unspecified mechanism (HCC)  Vascular dementia without behavioral disturbance (HCC)  Recommendations for Preventive Services Due: see orders and patient instructions/AVS.  Recommended screening schedule for the next 5-10 years is provided to the patient in written form: see Patient Instructions/AVS.     No follow-ups on file.     Subjective   Chief Complaint   Patient presents with    Medicare AWV       Patient's complete Health Risk Assessment and screening values have been reviewed and are found in Flowsheets. The following problems were reviewed today and where indicated follow up appointments were made and/or referrals ordered.    Positive Risk Factor Screenings with Interventions:       Cognitive:   Clock Drawing Test (CDT): (!) Abnormal  Words recalled: 0 Words Recalled  Total Score: (!) 0  Total Score Interpretation: Abnormal Mini-Cog  Interventions:  See AVS for additional education material  See A/P for plan and any pertinent orders            Inactivity:  On average, how many days per week do you engage in moderate to strenuous exercise (like a brisk walk)?: 2 days (!) Abnormal  On average, how many minutes do you engage in exercise at this level?: 10 min  Interventions:  See AVS for additional education material  See A/P for plan and any pertinent orders        Vision Screen:  Do you have difficulty driving, watching TV, or doing any of your daily activities because of your eyesight?: No  Have you had an eye exam within the past year?: (!) No  Interventions:   See AVS for  providing care):   Patient Care Team:  Farooq Moreno MD as PCP - General (Family Medicine)  Farooq Moreno MD as PCP - Empaneled Provider  Haja Parks MD as Consulting Physician (Neurology)      Reviewed and updated this visit:  Tobacco  Allergies  Meds  Problems  Med Hx  Surg Hx  Soc Hx  Fam Hx

## 2024-08-28 NOTE — TELEPHONE ENCOUNTER
Comments:     Last Office Visit (last PCP visit):   2/15/2024    Next Visit Date:  Future Appointments   Date Time Provider Department Center   8/28/2024 11:30 AM Farooq Moreno MD [facility]       **If hasn't been seen in over a year OR hasn't followed up according to last diabetes/ADHD visit, make appointment for patient before sending refill to provider.    Rx requested:  Requested Prescriptions     Pending Prescriptions Disp Refills    clopidogrel (PLAVIX) 75 MG tablet [Pharmacy Med Name: clopidogrel 75 mg tablet] 30 tablet 5     Sig: Take 1 tablet by mouth daily    donepezil (ARICEPT) 10 MG tablet [Pharmacy Med Name: donepezil 10 mg tablet] 30 tablet 5     Sig: TAKE 1 TABLET BY MOUTH DAILY EVERY MORNING    pravastatin (PRAVACHOL) 40 MG tablet [Pharmacy Med Name: pravastatin 40 mg tablet] 30 tablet 5     Sig: Take 1 tablet by mouth daily    losartan (COZAAR) 100 MG tablet [Pharmacy Med Name: losartan 100 mg tablet] 30 tablet 5     Sig: Take 1 tablet by mouth daily

## 2024-08-30 DIAGNOSIS — F41.9 ANXIETY: ICD-10-CM

## 2024-08-30 DIAGNOSIS — E11.65 TYPE 2 DIABETES MELLITUS WITH HYPERGLYCEMIA, WITHOUT LONG-TERM CURRENT USE OF INSULIN (HCC): ICD-10-CM

## 2024-08-30 LAB
ALBUMIN SERPL-MCNC: 4.4 G/DL (ref 3.5–4.6)
ALP SERPL-CCNC: 57 U/L (ref 35–104)
ALT SERPL-CCNC: 8 U/L (ref 0–41)
ANION GAP SERPL CALCULATED.3IONS-SCNC: 9 MEQ/L (ref 9–15)
AST SERPL-CCNC: 17 U/L (ref 0–40)
BILIRUB SERPL-MCNC: 0.5 MG/DL (ref 0.2–0.7)
BUN SERPL-MCNC: 15 MG/DL (ref 8–23)
CALCIUM SERPL-MCNC: 9.6 MG/DL (ref 8.5–9.9)
CHLORIDE SERPL-SCNC: 104 MEQ/L (ref 95–107)
CHOLEST SERPL-MCNC: 182 MG/DL (ref 0–199)
CO2 SERPL-SCNC: 27 MEQ/L (ref 20–31)
CREAT SERPL-MCNC: 1.02 MG/DL (ref 0.7–1.2)
ERYTHROCYTE [DISTWIDTH] IN BLOOD BY AUTOMATED COUNT: 13.6 % (ref 11.5–14.5)
GLOBULIN SER CALC-MCNC: 2.4 G/DL (ref 2.3–3.5)
GLUCOSE SERPL-MCNC: 81 MG/DL (ref 70–99)
HCT VFR BLD AUTO: 45.3 % (ref 42–52)
HDLC SERPL-MCNC: 60 MG/DL (ref 40–59)
HGB BLD-MCNC: 14.4 G/DL (ref 14–18)
LDLC SERPL CALC-MCNC: 110 MG/DL (ref 0–129)
MCH RBC QN AUTO: 29.6 PG (ref 27–31.3)
MCHC RBC AUTO-ENTMCNC: 31.8 % (ref 33–37)
MCV RBC AUTO: 93.2 FL (ref 79–92.2)
PLATELET # BLD AUTO: 295 K/UL (ref 130–400)
POTASSIUM SERPL-SCNC: 4.3 MEQ/L (ref 3.4–4.9)
PROT SERPL-MCNC: 6.8 G/DL (ref 6.3–8)
RBC # BLD AUTO: 4.86 M/UL (ref 4.7–6.1)
SODIUM SERPL-SCNC: 140 MEQ/L (ref 135–144)
TRIGL SERPL-MCNC: 60 MG/DL (ref 0–150)
TSH SERPL-MCNC: 1.98 UIU/ML (ref 0.44–3.86)
WBC # BLD AUTO: 7 K/UL (ref 4.8–10.8)

## 2024-08-31 LAB
ESTIMATED AVERAGE GLUCOSE: 111 MG/DL
HBA1C MFR BLD: 5.5 % (ref 4–6)

## 2024-09-27 DIAGNOSIS — F41.9 ANXIETY: ICD-10-CM

## 2024-09-27 RX ORDER — ESCITALOPRAM OXALATE 5 MG/1
5 TABLET ORAL DAILY
Qty: 30 TABLET | Refills: 5 | Status: SHIPPED | OUTPATIENT
Start: 2024-09-27

## 2024-10-11 DIAGNOSIS — E11.65 TYPE 2 DIABETES MELLITUS WITH HYPERGLYCEMIA, WITHOUT LONG-TERM CURRENT USE OF INSULIN (HCC): ICD-10-CM

## 2024-10-11 NOTE — TELEPHONE ENCOUNTER
Comments: pt is leaving out of town for a while will call back to schedule appt.     Last Office Visit (last PCP visit):   8/28/2024    Next Visit Date:  No future appointments.    **If hasn't been seen in over a year OR hasn't followed up according to last diabetes/ADHD visit, make appointment for patient before sending refill to provider.    Rx requested:  Requested Prescriptions     Pending Prescriptions Disp Refills    metFORMIN (GLUCOPHAGE) 1000 MG tablet 60 tablet 5     Sig: TAKE 1 TABLET BY MOUTH TWICE DAILY WITH MEALS

## 2024-11-06 ENCOUNTER — OFFICE VISIT (OUTPATIENT)
Dept: FAMILY MEDICINE CLINIC | Age: 69
End: 2024-11-06

## 2024-11-06 VITALS
HEART RATE: 66 BPM | HEIGHT: 69 IN | WEIGHT: 189.6 LBS | DIASTOLIC BLOOD PRESSURE: 82 MMHG | OXYGEN SATURATION: 97 % | BODY MASS INDEX: 28.08 KG/M2 | TEMPERATURE: 100.1 F | SYSTOLIC BLOOD PRESSURE: 136 MMHG

## 2024-11-06 DIAGNOSIS — R68.89 FLU-LIKE SYMPTOMS: Primary | ICD-10-CM

## 2024-11-06 DIAGNOSIS — J20.9 ACUTE BRONCHITIS, UNSPECIFIED ORGANISM: ICD-10-CM

## 2024-11-06 LAB
INFLUENZA A ANTIBODY: NORMAL
INFLUENZA B ANTIBODY: NORMAL
Lab: NORMAL
PERFORMING INSTRUMENT: NORMAL
QC PASS/FAIL: NORMAL
SARS-COV-2, POC: NORMAL

## 2024-11-06 RX ORDER — AZITHROMYCIN 250 MG/1
TABLET, FILM COATED ORAL
Qty: 6 TABLET | Refills: 0 | Status: SHIPPED | OUTPATIENT
Start: 2024-11-06 | End: 2024-11-16

## 2024-11-06 SDOH — ECONOMIC STABILITY: FOOD INSECURITY: WITHIN THE PAST 12 MONTHS, THE FOOD YOU BOUGHT JUST DIDN'T LAST AND YOU DIDN'T HAVE MONEY TO GET MORE.: PATIENT DECLINED

## 2024-11-06 SDOH — ECONOMIC STABILITY: INCOME INSECURITY: HOW HARD IS IT FOR YOU TO PAY FOR THE VERY BASICS LIKE FOOD, HOUSING, MEDICAL CARE, AND HEATING?: PATIENT DECLINED

## 2024-11-06 SDOH — ECONOMIC STABILITY: FOOD INSECURITY: WITHIN THE PAST 12 MONTHS, YOU WORRIED THAT YOUR FOOD WOULD RUN OUT BEFORE YOU GOT MONEY TO BUY MORE.: PATIENT DECLINED

## 2024-11-06 ASSESSMENT — ENCOUNTER SYMPTOMS
EYE PAIN: 0
CHEST TIGHTNESS: 0
DIARRHEA: 0
CONSTIPATION: 0
ABDOMINAL PAIN: 0
COUGH: 1
SORE THROAT: 0
RHINORRHEA: 1
TROUBLE SWALLOWING: 0
WHEEZING: 0
SHORTNESS OF BREATH: 0

## 2024-11-06 NOTE — PROGRESS NOTES
Subjective  Chief Complaint   Patient presents with    Cold Symptoms     Mucus, \"rattling cough,\" sx started last night. Temp has not been checked at home. Has not taken any OTC medications because of med interactions.    TEMP 100.1    Health Maintenance     Declines colonoscopy.       Cough  This is a new problem. The current episode started yesterday. The problem has been unchanged. The problem occurs constantly. The cough is Productive of sputum. Associated symptoms include a fever and rhinorrhea. Pertinent negatives include no chest pain, chills, ear pain, headaches, myalgias, sore throat, shortness of breath or wheezing. He has tried nothing for the symptoms. The treatment provided no relief. There is no history of asthma, COPD or pneumonia.       Wife notices that prior to this for a few weeks he has been choking more when he eats.     Past Medical History:   Diagnosis Date    Allergic rhinitis     hayfever    Hypertension     Pilonidal cyst     Stroke ([facility])     Tobacco use disorder      Patient Active Problem List    Diagnosis Date Noted    Vertigo 02/13/2023    Closed head injury 02/13/2023    VBI (vertebrobasilar insufficiency) 02/13/2023    Type 2 diabetes mellitus with hyperglycemia, without long-term current use of insulin ([facility]) 10/27/2021    Vascular dementia without behavioral disturbance ([facility]) 10/27/2021    Memory loss 10/27/2021    Pilonidal cyst     Allergic rhinitis     Hypertension     Small vessel cerebrovascular accident (CVA) ([facility]) 12/01/2008     No past surgical history on file.  Family History   Problem Relation Age of Onset    Stroke Mother     High Blood Pressure Mother     High Blood Pressure Father     Stroke Maternal Grandmother     Stroke Maternal Grandfather     Liver Cancer Maternal Uncle      Social History     Socioeconomic History    Marital status:      Spouse name: None    Number of children: None    Years of education: None    Highest education level: None

## 2024-11-19 ENCOUNTER — TELEPHONE (OUTPATIENT)
Dept: FAMILY MEDICINE CLINIC | Age: 69
End: 2024-11-19

## 2024-11-19 RX ORDER — ARIPIPRAZOLE 2 MG/1
2 TABLET ORAL DAILY
Qty: 30 TABLET | Refills: 3 | Status: SHIPPED | OUTPATIENT
Start: 2024-11-19

## 2024-11-19 NOTE — TELEPHONE ENCOUNTER
Pt's Aripiprazole PA was denied. Needs dx of behavioral disturbance psychotic disturbance, and mood disturbance.   Please advise

## 2024-11-19 NOTE — PROGRESS NOTES
Wife here for checkup    Increasing agitation    Wants to add something to lexapro    Will try low dose ability

## 2024-11-21 NOTE — TELEPHONE ENCOUNTER
Pt's Aripiprazole Appeal was denied.   \"A safety check is stopping payment of this drug. This class of drug as has a ' black box' sonny for use in dementia. We need to know if you are having any behaviors that may be dangerous. And we need to  know if they're causing distress.\"  Please adivse

## 2024-11-27 DIAGNOSIS — E11.65 TYPE 2 DIABETES MELLITUS WITH HYPERGLYCEMIA, WITHOUT LONG-TERM CURRENT USE OF INSULIN (HCC): ICD-10-CM

## 2024-11-27 RX ORDER — PIOGLITAZONE 30 MG/1
30 TABLET ORAL DAILY
Qty: 30 TABLET | Refills: 5 | Status: SHIPPED | OUTPATIENT
Start: 2024-11-27

## 2024-11-27 RX ORDER — MEMANTINE HYDROCHLORIDE 5 MG/1
TABLET ORAL
Qty: 60 TABLET | Refills: 5 | Status: SHIPPED | OUTPATIENT
Start: 2024-11-27

## 2025-01-21 ENCOUNTER — OFFICE VISIT (OUTPATIENT)
Dept: FAMILY MEDICINE CLINIC | Age: 70
End: 2025-01-21
Payer: MEDICARE

## 2025-01-21 VITALS
DIASTOLIC BLOOD PRESSURE: 88 MMHG | BODY MASS INDEX: 28.08 KG/M2 | HEART RATE: 53 BPM | SYSTOLIC BLOOD PRESSURE: 130 MMHG | OXYGEN SATURATION: 97 % | HEIGHT: 69 IN | TEMPERATURE: 97.5 F | WEIGHT: 189.6 LBS

## 2025-01-21 DIAGNOSIS — F01.50 VASCULAR DEMENTIA WITHOUT BEHAVIORAL DISTURBANCE (HCC): ICD-10-CM

## 2025-01-21 DIAGNOSIS — Z86.73 HISTORY OF STROKE: ICD-10-CM

## 2025-01-21 DIAGNOSIS — S99.922A INJURY OF TOE ON LEFT FOOT, INITIAL ENCOUNTER: Primary | ICD-10-CM

## 2025-01-21 DIAGNOSIS — E11.65 TYPE 2 DIABETES MELLITUS WITH HYPERGLYCEMIA, WITHOUT LONG-TERM CURRENT USE OF INSULIN (HCC): ICD-10-CM

## 2025-01-21 PROCEDURE — 3079F DIAST BP 80-89 MM HG: CPT | Performed by: FAMILY MEDICINE

## 2025-01-21 PROCEDURE — 3017F COLORECTAL CA SCREEN DOC REV: CPT | Performed by: FAMILY MEDICINE

## 2025-01-21 PROCEDURE — 1159F MED LIST DOCD IN RCRD: CPT | Performed by: FAMILY MEDICINE

## 2025-01-21 PROCEDURE — 1123F ACP DISCUSS/DSCN MKR DOCD: CPT | Performed by: FAMILY MEDICINE

## 2025-01-21 PROCEDURE — 2022F DILAT RTA XM EVC RTNOPTHY: CPT | Performed by: FAMILY MEDICINE

## 2025-01-21 PROCEDURE — 99213 OFFICE O/P EST LOW 20 MIN: CPT | Performed by: FAMILY MEDICINE

## 2025-01-21 PROCEDURE — 1125F AMNT PAIN NOTED PAIN PRSNT: CPT | Performed by: FAMILY MEDICINE

## 2025-01-21 PROCEDURE — 1036F TOBACCO NON-USER: CPT | Performed by: FAMILY MEDICINE

## 2025-01-21 PROCEDURE — G8417 CALC BMI ABV UP PARAM F/U: HCPCS | Performed by: FAMILY MEDICINE

## 2025-01-21 PROCEDURE — 1160F RVW MEDS BY RX/DR IN RCRD: CPT | Performed by: FAMILY MEDICINE

## 2025-01-21 PROCEDURE — 3046F HEMOGLOBIN A1C LEVEL >9.0%: CPT | Performed by: FAMILY MEDICINE

## 2025-01-21 PROCEDURE — G8427 DOCREV CUR MEDS BY ELIG CLIN: HCPCS | Performed by: FAMILY MEDICINE

## 2025-01-21 PROCEDURE — 3075F SYST BP GE 130 - 139MM HG: CPT | Performed by: FAMILY MEDICINE

## 2025-01-21 SDOH — ECONOMIC STABILITY: FOOD INSECURITY: WITHIN THE PAST 12 MONTHS, YOU WORRIED THAT YOUR FOOD WOULD RUN OUT BEFORE YOU GOT MONEY TO BUY MORE.: NEVER TRUE

## 2025-01-21 SDOH — ECONOMIC STABILITY: FOOD INSECURITY: WITHIN THE PAST 12 MONTHS, THE FOOD YOU BOUGHT JUST DIDN'T LAST AND YOU DIDN'T HAVE MONEY TO GET MORE.: NEVER TRUE

## 2025-01-21 ASSESSMENT — PATIENT HEALTH QUESTIONNAIRE - PHQ9
SUM OF ALL RESPONSES TO PHQ QUESTIONS 1-9: 0
SUM OF ALL RESPONSES TO PHQ QUESTIONS 1-9: 0
SUM OF ALL RESPONSES TO PHQ9 QUESTIONS 1 & 2: 0
SUM OF ALL RESPONSES TO PHQ QUESTIONS 1-9: 0
1. LITTLE INTEREST OR PLEASURE IN DOING THINGS: NOT AT ALL
2. FEELING DOWN, DEPRESSED OR HOPELESS: NOT AT ALL
SUM OF ALL RESPONSES TO PHQ QUESTIONS 1-9: 0

## 2025-01-21 NOTE — PROGRESS NOTES
Chief Complaint   Patient presents with    Fall     X Friday, fell and broke toe/foot left        HPI:  Ady Rodriguez is a 69 y.o. male      Fall on Friday       Not complaining of pain, but wife noticed extensive bruising of second left toe       Hx CVA, HTN, dyslipidemia, diabetes        Recent MRA brain showed the results below   1. High-grade stenosis in the distal right vertebral artery.  Further   evaluation with CTA of the neck or pre and post contrast enhanced MRA of the   neck is recommended.   2. Moderate stenoses in the P2 segment of the right posterior cerebral artery.   3. Mild-to-moderate stenosis in the proximal left cavernous ICA.   4. The MRA of the brain from 2013 was normal.             Lab Results   Component Value Date    LABA1C 5.5 2024     Lab Results   Component Value Date     2024         Social History     Socioeconomic History    Marital status:      Spouse name: None    Number of children: None    Years of education: None    Highest education level: None   Tobacco Use    Smoking status: Former     Current packs/day: 0.00     Average packs/day: 0.5 packs/day for 37.9 years (18.9 ttl pk-yrs)     Types: Cigarettes     Start date: 1976     Quit date: 2013     Years since quittin.2    Smokeless tobacco: Never   Substance and Sexual Activity    Alcohol use: Yes     Comment: social - only small amount on Friday, sometimes heavier on Saturday     Social Determinants of Health     Financial Resource Strain: Patient Declined (2024)    Overall Financial Resource Strain (CARDIA)     Difficulty of Paying Living Expenses: Patient declined   Food Insecurity: No Food Insecurity (2025)    Hunger Vital Sign     Worried About Running Out of Food in the Last Year: Never true     Ran Out of Food in the Last Year: Never true   Transportation Needs: No Transportation Needs (2025)    PRAPARE - Transportation     Lack of Transportation (Medical): No

## 2025-03-12 DIAGNOSIS — I63.00 CEREBROVASCULAR ACCIDENT (CVA) DUE TO THROMBOSIS OF PRECEREBRAL ARTERY (HCC): ICD-10-CM

## 2025-03-12 DIAGNOSIS — I63.9 CEREBROVASCULAR ACCIDENT (CVA), UNSPECIFIED MECHANISM (HCC): ICD-10-CM

## 2025-03-12 DIAGNOSIS — I10 ESSENTIAL HYPERTENSION: ICD-10-CM

## 2025-03-12 RX ORDER — CLOPIDOGREL BISULFATE 75 MG/1
75 TABLET ORAL DAILY
Qty: 30 TABLET | Refills: 5 | Status: SHIPPED | OUTPATIENT
Start: 2025-03-12

## 2025-03-12 RX ORDER — LOSARTAN POTASSIUM 100 MG/1
100 TABLET ORAL DAILY
Qty: 30 TABLET | Refills: 5 | Status: SHIPPED | OUTPATIENT
Start: 2025-03-12

## 2025-03-12 RX ORDER — PRAVASTATIN SODIUM 40 MG
40 TABLET ORAL DAILY
Qty: 30 TABLET | Refills: 5 | Status: SHIPPED | OUTPATIENT
Start: 2025-03-12

## 2025-03-12 RX ORDER — DONEPEZIL HYDROCHLORIDE 10 MG/1
TABLET, FILM COATED ORAL
Qty: 30 TABLET | Refills: 5 | Status: SHIPPED | OUTPATIENT
Start: 2025-03-12

## 2025-03-21 ENCOUNTER — TELEPHONE (OUTPATIENT)
Dept: FAMILY MEDICINE CLINIC | Age: 70
End: 2025-03-21

## 2025-03-21 NOTE — TELEPHONE ENCOUNTER
Pt's wife called. She is asking if there is a way that Sunday's meds all be chewables or liquid. He is having issues swallowing pills. Please call and let them know any options.     [phone]

## 2025-04-11 DIAGNOSIS — E11.65 TYPE 2 DIABETES MELLITUS WITH HYPERGLYCEMIA, WITHOUT LONG-TERM CURRENT USE OF INSULIN (HCC): ICD-10-CM

## 2025-04-11 DIAGNOSIS — F41.9 ANXIETY: ICD-10-CM

## 2025-04-11 RX ORDER — ESCITALOPRAM OXALATE 5 MG/1
5 TABLET ORAL DAILY
Qty: 30 TABLET | Refills: 5 | Status: SHIPPED | OUTPATIENT
Start: 2025-04-11

## 2025-06-12 DIAGNOSIS — E11.65 TYPE 2 DIABETES MELLITUS WITH HYPERGLYCEMIA, WITHOUT LONG-TERM CURRENT USE OF INSULIN (HCC): ICD-10-CM

## 2025-06-12 RX ORDER — PIOGLITAZONE 30 MG/1
30 TABLET ORAL DAILY
Qty: 30 TABLET | Refills: 5 | Status: SHIPPED | OUTPATIENT
Start: 2025-06-12

## 2025-06-12 RX ORDER — MEMANTINE HYDROCHLORIDE 5 MG/1
5 TABLET ORAL 2 TIMES DAILY
Qty: 60 TABLET | Refills: 5 | Status: SHIPPED | OUTPATIENT
Start: 2025-06-12

## 2025-06-12 NOTE — TELEPHONE ENCOUNTER
Comments: LMTCB regarding appt    Last Office Visit (last PCP visit):   1/21/2025    Next Visit Date:  No future appointments.    **If hasn't been seen in over a year OR hasn't followed up according to last diabetes/ADHD visit, make appointment for patient before sending refill to provider.    Rx requested:  Requested Prescriptions     Pending Prescriptions Disp Refills    memantine (NAMENDA) 5 MG tablet [Pharmacy Med Name: memantine 5 mg tablet] 60 tablet 5     Sig: TAKE 1 TABLET BY MOUTH TWICE DAILY    pioglitazone (ACTOS) 30 MG tablet [Pharmacy Med Name: pioglitazone 30 mg tablet] 30 tablet 5     Sig: Take 1 tablet by mouth daily

## 2025-07-11 DIAGNOSIS — I63.00 CEREBROVASCULAR ACCIDENT (CVA) DUE TO THROMBOSIS OF PRECEREBRAL ARTERY (HCC): ICD-10-CM

## 2025-07-11 DIAGNOSIS — I10 ESSENTIAL HYPERTENSION: ICD-10-CM

## 2025-07-11 DIAGNOSIS — I63.9 CEREBROVASCULAR ACCIDENT (CVA), UNSPECIFIED MECHANISM (HCC): ICD-10-CM

## 2025-07-14 NOTE — TELEPHONE ENCOUNTER
Comments:     Last Office Visit (last PCP visit):   1/21/2025    Next Visit Date:  No future appointments.    **If hasn't been seen in over a year OR hasn't followed up according to last diabetes/ADHD visit, make appointment for patient before sending refill to provider.    Rx requested:  Requested Prescriptions     Pending Prescriptions Disp Refills    pravastatin (PRAVACHOL) 40 MG tablet [Pharmacy Med Name: pravastatin 40 mg tablet] 30 tablet 5     Sig: Take 1 tablet by mouth daily    losartan (COZAAR) 100 MG tablet [Pharmacy Med Name: losartan 100 mg tablet] 30 tablet 5     Sig: Take 1 tablet by mouth daily    clopidogrel (PLAVIX) 75 MG tablet [Pharmacy Med Name: clopidogrel 75 mg tablet] 30 tablet 5     Sig: Take 1 tablet by mouth daily    donepezil (ARICEPT) 10 MG tablet [Pharmacy Med Name: donepezil 10 mg tablet] 30 tablet 5     Sig: TAKE 1 TABLET BY MOUTH DAILY EVERY MORNING

## 2025-07-15 RX ORDER — DONEPEZIL HYDROCHLORIDE 10 MG/1
TABLET, FILM COATED ORAL
Qty: 30 TABLET | Refills: 5 | Status: SHIPPED | OUTPATIENT
Start: 2025-07-15

## 2025-07-15 RX ORDER — LOSARTAN POTASSIUM 100 MG/1
100 TABLET ORAL DAILY
Qty: 30 TABLET | Refills: 5 | Status: SHIPPED | OUTPATIENT
Start: 2025-07-15

## 2025-07-15 RX ORDER — CLOPIDOGREL BISULFATE 75 MG/1
75 TABLET ORAL DAILY
Qty: 30 TABLET | Refills: 5 | Status: SHIPPED | OUTPATIENT
Start: 2025-07-15

## 2025-07-15 RX ORDER — PRAVASTATIN SODIUM 40 MG
40 TABLET ORAL DAILY
Qty: 30 TABLET | Refills: 5 | Status: SHIPPED | OUTPATIENT
Start: 2025-07-15

## 2025-08-22 ENCOUNTER — OFFICE VISIT (OUTPATIENT)
Dept: FAMILY MEDICINE CLINIC | Age: 70
End: 2025-08-22

## 2025-08-22 VITALS
OXYGEN SATURATION: 96 % | WEIGHT: 185.2 LBS | HEIGHT: 69 IN | HEART RATE: 66 BPM | DIASTOLIC BLOOD PRESSURE: 70 MMHG | BODY MASS INDEX: 27.43 KG/M2 | SYSTOLIC BLOOD PRESSURE: 120 MMHG

## 2025-08-22 DIAGNOSIS — F41.9 ANXIETY: ICD-10-CM

## 2025-08-22 DIAGNOSIS — E11.65 TYPE 2 DIABETES MELLITUS WITH HYPERGLYCEMIA, WITHOUT LONG-TERM CURRENT USE OF INSULIN (HCC): ICD-10-CM

## 2025-08-22 DIAGNOSIS — I63.00 CEREBROVASCULAR ACCIDENT (CVA) DUE TO THROMBOSIS OF PRECEREBRAL ARTERY (HCC): ICD-10-CM

## 2025-08-22 DIAGNOSIS — I10 ESSENTIAL HYPERTENSION: Primary | ICD-10-CM

## 2025-08-22 RX ORDER — LORAZEPAM 0.5 MG/1
0.5 TABLET ORAL EVERY 8 HOURS PRN
Qty: 20 TABLET | Refills: 0 | Status: SHIPPED | OUTPATIENT
Start: 2025-08-22 | End: 2025-08-29

## 2025-08-22 RX ORDER — ESCITALOPRAM OXALATE 10 MG/1
10 TABLET ORAL DAILY
Qty: 30 TABLET | Refills: 5 | Status: SHIPPED | OUTPATIENT
Start: 2025-08-22

## 2025-08-26 DIAGNOSIS — E11.65 TYPE 2 DIABETES MELLITUS WITH HYPERGLYCEMIA, WITHOUT LONG-TERM CURRENT USE OF INSULIN (HCC): ICD-10-CM
